# Patient Record
Sex: MALE | Race: OTHER | HISPANIC OR LATINO
[De-identification: names, ages, dates, MRNs, and addresses within clinical notes are randomized per-mention and may not be internally consistent; named-entity substitution may affect disease eponyms.]

---

## 2017-02-10 ENCOUNTER — APPOINTMENT (OUTPATIENT)
Dept: INTERNAL MEDICINE | Facility: CLINIC | Age: 47
End: 2017-02-10

## 2017-02-10 VITALS
DIASTOLIC BLOOD PRESSURE: 70 MMHG | WEIGHT: 180 LBS | SYSTOLIC BLOOD PRESSURE: 110 MMHG | BODY MASS INDEX: 28.25 KG/M2 | HEART RATE: 76 BPM | HEIGHT: 67 IN

## 2017-02-10 DIAGNOSIS — G50.0 TRIGEMINAL NEURALGIA: ICD-10-CM

## 2017-02-10 DIAGNOSIS — Z78.9 OTHER SPECIFIED HEALTH STATUS: ICD-10-CM

## 2017-03-06 LAB
ANION GAP SERPL CALC-SCNC: 7 MEQ/L
BUN SERPL-MCNC: 16 MG/DL
BUN/CREAT SERPL: 19 %
CALCIUM SERPL-MCNC: 9.1 MG/DL
CHLORIDE SERPL-SCNC: 106 MEQ/L
CHOLEST SERPL-MCNC: 217 MG/DL
CO2 SERPL-SCNC: 27 MEQ/L
CREAT SERPL-MCNC: 0.84 MG/DL
ESTIMATED AVERGAGE GLUCOSE (NORTH): 108 MG/DL
GFR SERPL CREATININE-BSD FRML MDRD: 98
GLUCOSE SERPL-MCNC: 87 MG/DL
HBA1C MFR BLD: 5.4 %
HDLC SERPL-MCNC: 47 MG/DL
HDLC SERPL: 4.62
LDLC SERPL DIRECT ASSAY-MCNC: 145 MG/DL
POTASSIUM SERPL-SCNC: 4.7 MMOL/L
SODIUM SERPL-SCNC: 140 MEQ/L
TRIGL SERPL-MCNC: 125 MG/DL
VLDLC SERPL-MCNC: 25 MG/DL

## 2017-03-17 LAB
BASOPHILS # BLD: 0.03 TH/MM3
BASOPHILS NFR BLD: 0.7 %
EOSINOPHIL # BLD: 0.08 TH/MM3
EOSINOPHIL NFR BLD: 1.9 %
ERYTHROCYTE [DISTWIDTH] IN BLOOD BY AUTOMATED COUNT: 13.6 %
GRANULOCYTES # BLD: 2.4 TH/MM3
GRANULOCYTES NFR BLD: 56.8 %
HCT VFR BLD AUTO: 43 %
HGB BLD-MCNC: 14.2 G/DL
IMM GRANULOCYTES # BLD: 0 TH/MM3
IMM GRANULOCYTES NFR BLD: 0 %
LYMPHOCYTES # BLD: 1.38 TH/MM3
LYMPHOCYTES NFR BLD: 32.6 %
MCH RBC QN AUTO: 29 PG
MCHC RBC AUTO-ENTMCNC: 33 G/DL
MCV RBC AUTO: 87.8 FL
MONOCYTES # BLD: 0.34 TH/MM3
MONOCYTES NFR BLD: 8 %
PLATELET # BLD: 221 TH/MM3
PMV BLD AUTO: 10.6 FL
RBC # BLD AUTO: 4.9 MIL/MM3
WBC # BLD: 4.23 TH/MM3

## 2017-03-29 ENCOUNTER — OUTPATIENT (OUTPATIENT)
Dept: OUTPATIENT SERVICES | Facility: HOSPITAL | Age: 47
LOS: 1 days | Discharge: HOME | End: 2017-03-29

## 2017-03-29 ENCOUNTER — APPOINTMENT (OUTPATIENT)
Dept: INTERNAL MEDICINE | Facility: CLINIC | Age: 47
End: 2017-03-29

## 2017-03-29 VITALS
HEIGHT: 67 IN | WEIGHT: 182 LBS | HEART RATE: 61 BPM | DIASTOLIC BLOOD PRESSURE: 72 MMHG | SYSTOLIC BLOOD PRESSURE: 135 MMHG | BODY MASS INDEX: 28.56 KG/M2

## 2017-06-27 DIAGNOSIS — F41.9 ANXIETY DISORDER, UNSPECIFIED: ICD-10-CM

## 2018-06-29 ENCOUNTER — APPOINTMENT (OUTPATIENT)
Dept: INTERNAL MEDICINE | Facility: CLINIC | Age: 48
End: 2018-06-29

## 2018-06-29 ENCOUNTER — OUTPATIENT (OUTPATIENT)
Dept: OUTPATIENT SERVICES | Facility: HOSPITAL | Age: 48
LOS: 1 days | Discharge: HOME | End: 2018-06-29

## 2018-06-29 VITALS
BODY MASS INDEX: 28.56 KG/M2 | TEMPERATURE: 97 F | SYSTOLIC BLOOD PRESSURE: 144 MMHG | HEIGHT: 67 IN | HEART RATE: 77 BPM | DIASTOLIC BLOOD PRESSURE: 75 MMHG | WEIGHT: 182 LBS

## 2018-06-29 DIAGNOSIS — F41.9 ANXIETY DISORDER, UNSPECIFIED: ICD-10-CM

## 2018-06-29 DIAGNOSIS — Z87.19 PERSONAL HISTORY OF OTHER DISEASES OF THE DIGESTIVE SYSTEM: ICD-10-CM

## 2018-06-29 NOTE — HISTORY OF PRESENT ILLNESS
[de-identified] : 46 year old male with history of rectal bleeding secondary to hemorrhoids and also on zoloft for anxiety here for follow up examination. Last seen on 03/29/2017. Patient currently denies any complaints.\par \par Patient denies any fevers, chills, and night sweats. Patient denies any chest pain, shortness of breath, and palpitations. Patient denies any nausea, vomiting, and diarrhea.\par

## 2018-06-29 NOTE — ASSESSMENT
[FreeTextEntry1] : 47 y.o.m presents for f/u. Patient currently denies any complaints.\par \par #) Anxiety\par -well controlled on Zoloft 75mg. \par -follows an outside psychiatrist (psychiatrist at [facility] Dr. Lalit Interiano)\par \par #) HCM\par -flu shot not currently indicated, will address at next visit\par -Td given\par -BMP/CBC (02/27/2017)- wnl\par -A1c (02/16/2017)- 5.4\par -Tot. Chol.- 217, TG- 125, LDL- 145, HDL- 47\par -ASCVD score- 4.1%, low risk\par -colonoscopy done 08/17/2016, next one recommended in 2019\par -RTC in 6-8 weeks repeat b/w ordered

## 2018-08-03 ENCOUNTER — LABORATORY RESULT (OUTPATIENT)
Age: 48
End: 2018-08-03

## 2018-08-11 LAB
ALBUMIN SERPL ELPH-MCNC: 4.4 G/DL
ALP BLD-CCNC: 98 U/L
ALT SERPL-CCNC: 26 U/L
ANION GAP SERPL CALC-SCNC: 15 MMOL/L
AST SERPL-CCNC: 23 U/L
BASOPHILS # BLD AUTO: 0.04 K/UL
BASOPHILS NFR BLD AUTO: 1 %
BILIRUB SERPL-MCNC: 0.6 MG/DL
BUN SERPL-MCNC: 19 MG/DL
CALCIUM SERPL-MCNC: 8.7 MG/DL
CHLORIDE SERPL-SCNC: 100 MMOL/L
CHOLEST SERPL-MCNC: 209 MG/DL
CHOLEST/HDLC SERPL: 4.1 RATIO
CO2 SERPL-SCNC: 22 MMOL/L
CREAT SERPL-MCNC: 0.8 MG/DL
EOSINOPHIL # BLD AUTO: 0.19 K/UL
EOSINOPHIL NFR BLD AUTO: 4.6 %
GLUCOSE SERPL-MCNC: 103 MG/DL
HCT VFR BLD CALC: 39.9 %
HDLC SERPL-MCNC: 51 MG/DL
HGB BLD-MCNC: 12.4 G/DL
IMM GRANULOCYTES NFR BLD AUTO: 0.2 %
LDLC SERPL CALC-MCNC: 155 MG/DL
LYMPHOCYTES # BLD AUTO: 1.29 K/UL
LYMPHOCYTES NFR BLD AUTO: 31.2 %
MAN DIFF?: NORMAL
MCHC RBC-ENTMCNC: 25 PG
MCHC RBC-ENTMCNC: 31.1 G/DL
MCV RBC AUTO: 80.4 FL
MONOCYTES # BLD AUTO: 0.3 K/UL
MONOCYTES NFR BLD AUTO: 7.3 %
NEUTROPHILS # BLD AUTO: 2.3 K/UL
NEUTROPHILS NFR BLD AUTO: 55.7 %
PLATELET # BLD AUTO: 224 K/UL
POTASSIUM SERPL-SCNC: 3.8 MMOL/L
PROT SERPL-MCNC: 7.2 G/DL
RBC # BLD: 4.96 M/UL
RBC # FLD: 16.3 %
SODIUM SERPL-SCNC: 137 MMOL/L
TRIGL SERPL-MCNC: 61 MG/DL
TSH SERPL-ACNC: 3.01 UIU/ML
WBC # FLD AUTO: 4.13 K/UL

## 2018-11-02 ENCOUNTER — APPOINTMENT (OUTPATIENT)
Dept: INTERNAL MEDICINE | Facility: CLINIC | Age: 48
End: 2018-11-02

## 2018-11-02 ENCOUNTER — OUTPATIENT (OUTPATIENT)
Dept: OUTPATIENT SERVICES | Facility: HOSPITAL | Age: 48
LOS: 1 days | Discharge: HOME | End: 2018-11-02

## 2018-11-02 VITALS
DIASTOLIC BLOOD PRESSURE: 73 MMHG | HEART RATE: 62 BPM | BODY MASS INDEX: 28.72 KG/M2 | WEIGHT: 183 LBS | TEMPERATURE: 97.9 F | HEIGHT: 67 IN | SYSTOLIC BLOOD PRESSURE: 113 MMHG

## 2018-11-02 NOTE — COUNSELING
[Weight management counseling provided] : Weight management [Healthy eating counseling provided] : healthy eating [Low Fat Diet] : Low fat diet [Behavioral health counseling provided] : behavioral health  [Engage in a relaxing activity] : Engage in a relaxing activity [None] : None

## 2018-11-02 NOTE — HISTORY OF PRESENT ILLNESS
[FreeTextEntry1] : follow up [de-identified] : 46 year old male with history of rectal bleeding secondary to hemorrhoids and anxiety here for follow up examination. Patient currently denies any complaints. Patient denies any fevers, chills, and night sweats. Patient denies any chest pain, shortness of breath, and palpitations. Patient denies any nausea, vomiting, and diarrhea.

## 2018-11-02 NOTE — ASSESSMENT
[FreeTextEntry1] : 48 year old man presents for f/u no medical problems, does follow up for Mental health\par \par Anxiety\par -well controlled on Zoloft 75mg. \par -follows an outside psychiatrist (psychiatrist at [facility] Dr. Lalit Interiano)\par \par HCM\par -Td given last visit\par -BMP/CBC (02/27/2017)- wnl\par -A1c (08/3/2018)- 5.9. Advised carbohydrate consistent diet and exercise. Will repeat in 6 months\par -Tot. Chol. 209, TG 61, , HDL 51. Advised low fat, cholesterol diet and exercise.\par -colonoscopy done 08/17/2016, next one recommended in 2019\par \par

## 2019-06-14 ENCOUNTER — OUTPATIENT (OUTPATIENT)
Dept: OUTPATIENT SERVICES | Facility: HOSPITAL | Age: 49
LOS: 1 days | Discharge: HOME | End: 2019-06-14

## 2019-06-14 ENCOUNTER — APPOINTMENT (OUTPATIENT)
Dept: INTERNAL MEDICINE | Facility: CLINIC | Age: 49
End: 2019-06-14

## 2019-06-14 VITALS
HEIGHT: 67 IN | SYSTOLIC BLOOD PRESSURE: 119 MMHG | TEMPERATURE: 97.9 F | HEART RATE: 59 BPM | BODY MASS INDEX: 27.47 KG/M2 | DIASTOLIC BLOOD PRESSURE: 74 MMHG | WEIGHT: 175 LBS

## 2019-06-14 DIAGNOSIS — F41.9 ANXIETY DISORDER, UNSPECIFIED: ICD-10-CM

## 2019-06-14 DIAGNOSIS — Z00.00 ENCOUNTER FOR GENERAL ADULT MEDICAL EXAMINATION WITHOUT ABNORMAL FINDINGS: ICD-10-CM

## 2019-06-14 NOTE — PHYSICAL EXAM
[No Acute Distress] : no acute distress [Normal Sclera/Conjunctiva] : normal sclera/conjunctiva [Normal Outer Ear/Nose] : the outer ears and nose were normal in appearance [No Respiratory Distress] : no respiratory distress  [No JVD] : no jugular venous distention [Normal Rate] : normal rate

## 2019-06-14 NOTE — ASSESSMENT
[FreeTextEntry1] : 1 year old man presents for f/u no medical problems, does follow up for Mental health\par \par Anxiety\par -well controlled on Zoloft 75mg. \par -follows an outside psychiatrist (psychiatrist at [facility] Dr. Lalit Interiano)\par \par HCM\par -Td given last visit\par -BMP/CBC 2018 - wnl\par -A1c (08/3/2018)- 5.9. Advised carbohydrate consistent diet and exercise. Will repeat in 6 months\par -Tot. Chol. 209, TG 61, , HDL 51. Advised low fat, cholesterol diet and exercise.\par -colonoscopy done 08/17/2016,  \par \par

## 2019-06-14 NOTE — HISTORY OF PRESENT ILLNESS
[FreeTextEntry1] : 46 year old male with history of rectal bleeding secondary to hemorrhoids and anxiety here for follow up examination. Patient currently denies any complaints. Patient denies any fevers, chills, and night sweats. Patient denies any chest pain, shortness of breath, and palpitations. Patient denies any nausea, vomiting, and diarrhea. \par  [de-identified] : 46 year old male with history of rectal bleeding secondary to hemorrhoids and anxiety here for follow up examination. Patient currently denies any complaints. Patient denies any fevers, chills, and night sweats. Patient denies any chest pain, shortness of breath, and palpitations. Patient denies any nausea, vomiting, and diarrhea. \par

## 2019-10-04 LAB
ALBUMIN SERPL ELPH-MCNC: 4.3 G/DL
ALP BLD-CCNC: 107 U/L
ALT SERPL-CCNC: 20 U/L
ANION GAP SERPL CALC-SCNC: 10 MMOL/L
AST SERPL-CCNC: 22 U/L
BASOPHILS # BLD AUTO: 0.04 K/UL
BASOPHILS NFR BLD AUTO: 0.9 %
BILIRUB SERPL-MCNC: 0.4 MG/DL
BUN SERPL-MCNC: 17 MG/DL
CALCIUM SERPL-MCNC: 8.9 MG/DL
CHLORIDE SERPL-SCNC: 104 MMOL/L
CHOLEST SERPL-MCNC: 222 MG/DL
CHOLEST/HDLC SERPL: 4 RATIO
CO2 SERPL-SCNC: 26 MMOL/L
CREAT SERPL-MCNC: 0.7 MG/DL
EOSINOPHIL # BLD AUTO: 0.13 K/UL
EOSINOPHIL NFR BLD AUTO: 3 %
ESTIMATED AVERAGE GLUCOSE: 108 MG/DL
GLUCOSE SERPL-MCNC: 97 MG/DL
HBA1C MFR BLD HPLC: 5.4 %
HCT VFR BLD CALC: 36.6 %
HDLC SERPL-MCNC: 55 MG/DL
HGB BLD-MCNC: 11 G/DL
IMM GRANULOCYTES NFR BLD AUTO: 0.2 %
LDLC SERPL CALC-MCNC: 159 MG/DL
LYMPHOCYTES # BLD AUTO: 1.53 K/UL
LYMPHOCYTES NFR BLD AUTO: 35.3 %
MAN DIFF?: NORMAL
MCHC RBC-ENTMCNC: 22.2 PG
MCHC RBC-ENTMCNC: 30.1 G/DL
MCV RBC AUTO: 73.9 FL
MONOCYTES # BLD AUTO: 0.28 K/UL
MONOCYTES NFR BLD AUTO: 6.5 %
NEUTROPHILS # BLD AUTO: 2.34 K/UL
NEUTROPHILS NFR BLD AUTO: 54.1 %
PLATELET # BLD AUTO: 248 K/UL
POTASSIUM SERPL-SCNC: 4.8 MMOL/L
PROT SERPL-MCNC: 7 G/DL
RBC # BLD: 4.95 M/UL
RBC # FLD: 17.9 %
SODIUM SERPL-SCNC: 140 MMOL/L
TRIGL SERPL-MCNC: 87 MG/DL
WBC # FLD AUTO: 4.33 K/UL

## 2019-10-11 ENCOUNTER — OUTPATIENT (OUTPATIENT)
Dept: OUTPATIENT SERVICES | Facility: HOSPITAL | Age: 49
LOS: 1 days | Discharge: HOME | End: 2019-10-11

## 2019-10-11 ENCOUNTER — APPOINTMENT (OUTPATIENT)
Dept: INTERNAL MEDICINE | Facility: CLINIC | Age: 49
End: 2019-10-11
Payer: COMMERCIAL

## 2019-10-11 VITALS
DIASTOLIC BLOOD PRESSURE: 81 MMHG | TEMPERATURE: 97.3 F | SYSTOLIC BLOOD PRESSURE: 133 MMHG | HEIGHT: 67 IN | HEART RATE: 61 BPM | WEIGHT: 183 LBS | BODY MASS INDEX: 28.72 KG/M2

## 2019-10-11 PROCEDURE — 99212 OFFICE O/P EST SF 10 MIN: CPT

## 2019-10-11 NOTE — ASSESSMENT
[FreeTextEntry1] : 49 year old man presents for routine follow up.\par \par Microcytic anemia\par - Hgb 11 [from 14 in 2017] ; MCV 73.9\par - will send for iron studies\par - GI referral \par - FU 8-10 weeks \par \par Anxiety\par -will increase zoloft to 75 and d/c clonazepam. \par -states outside psychiatrist moved to [place]; in the process of finding another Lebanese speaking psychiatrist, but finds his symptoms well controlled \par - offered pt combination therapy with trileptal however, pt denied \par - will see pt in 8-10 weeks to FU\par \par HCM\par -Flu vaccine today 10/11/2019\par -BMP 9/2019 - wnl\par -A1c (09/2019)- 5.4. Advised carbohydrate consistent diet and exercise. Will repeat in 6 months\par -Tot. Chol. 222, TG 87, , HDL 55. Advised low fat, cholesterol diet and exercise.\par -colonoscopy done 08/17/2016; resend to GI clinic in light of hx and new anemia \par

## 2019-10-11 NOTE — HISTORY OF PRESENT ILLNESS
[FreeTextEntry1] : follow up  [de-identified] : 46 year old male with history of rectal bleeding secondary to hemorrhoids and anxiety here for follow up examination; He was last seen in June 2019.\par \par Patient currently denies any complaints. Patient denies any fevers, chills, and night sweats. Patient denies any chest pain, shortness of breath, and palpitations. Patient denies any nausea, vomiting, and diarrhea. He has had no bleeding events since his last visit. \par  \par

## 2019-10-21 DIAGNOSIS — Z23 ENCOUNTER FOR IMMUNIZATION: ICD-10-CM

## 2019-10-21 DIAGNOSIS — Z00.00 ENCOUNTER FOR GENERAL ADULT MEDICAL EXAMINATION WITHOUT ABNORMAL FINDINGS: ICD-10-CM

## 2019-10-21 DIAGNOSIS — D64.9 ANEMIA, UNSPECIFIED: ICD-10-CM

## 2020-01-01 LAB
RBC # BLD: 4.91 M/UL
RETICS # AUTO: 1.3 %
RETICS AGGREG/RBC NFR: 64.3 K/UL

## 2020-01-03 ENCOUNTER — APPOINTMENT (OUTPATIENT)
Dept: GASTROENTEROLOGY | Facility: CLINIC | Age: 50
End: 2020-01-03

## 2020-02-28 ENCOUNTER — APPOINTMENT (OUTPATIENT)
Dept: INTERNAL MEDICINE | Facility: CLINIC | Age: 50
End: 2020-02-28
Payer: COMMERCIAL

## 2020-02-28 ENCOUNTER — OUTPATIENT (OUTPATIENT)
Dept: OUTPATIENT SERVICES | Facility: HOSPITAL | Age: 50
LOS: 1 days | Discharge: HOME | End: 2020-02-28

## 2020-02-28 VITALS
WEIGHT: 192 LBS | HEART RATE: 62 BPM | SYSTOLIC BLOOD PRESSURE: 152 MMHG | HEIGHT: 67 IN | BODY MASS INDEX: 30.13 KG/M2 | DIASTOLIC BLOOD PRESSURE: 76 MMHG

## 2020-02-28 DIAGNOSIS — R63.5 ABNORMAL WEIGHT GAIN: ICD-10-CM

## 2020-02-28 PROCEDURE — 99213 OFFICE O/P EST LOW 20 MIN: CPT | Mod: GC

## 2020-02-28 NOTE — HISTORY OF PRESENT ILLNESS
[FreeTextEntry1] : follow up after 10/19 [de-identified] : 49 year old male with history of rectal bleeding secondary to hemorrhoids and anxiety here for follow up examination; He was last seen in oct,19 \par Patient currently denies any complaints. Patient denies any fevers, chills, and night sweats. Patient denies any chest pain, shortness of breath, and palpitations. Patient denies any nausea, vomiting, and diarrhea. He has had no bleeding events since his last visit. \par colon ; 2016 showed internal and external hemorrhoids ,fair prep and was asked to repeat colonoscopy  in 3 yrs .\par Last Visit he was noticed to be microcytic anemia,was asked to see GI and repeat iron studies which were not done . \par  \par

## 2020-02-28 NOTE — ASSESSMENT
[FreeTextEntry1] : 49 year old male with history of rectal bleeding secondary to hemorrhoids,new microcytic anemia  and anxiety here for follow up after 10/19\par \par #Microcytic anemia\par - Hgb 11 [from 14 in 2017] ; MCV 73.9\par - will send for iron studies \par \par #Anxiety\par -will increase zoloft to 75 and d/c clonazepam. \par -states outside psychiatrist moved to [place]; in the process of finding another Jordanian speaking psychiatrist, but finds his symptoms well controlled \par - offered pt combination therapy with trileptal however, pt denied \par \par # elevated bp ;\par repeat 140/100\par will monitor and  re eval pt in 2 mo\par \par # weight gain ;\par advised wt loss and dietary modification\par \par \par HCM\par -Flu vaccine today 10/11/2019\par -BMP 9/2019 - wnl\par -A1c (09/2019)- 5.4. Advised carbohydrate consistent diet and exercise. Will repeat in 6 months\par -Tot. Chol. 222, TG 87, , HDL 55. Advised low fat, cholesterol diet and exercise.\par -colonoscopy done 08/17/2016; resend to GI clinic in light of hx and new anemia \par also last colon prep was fair and was advised to have repeat colonoscopy in 3 yrs \par follow up in 2  mo\par

## 2020-02-28 NOTE — PHYSICAL EXAM
[No Acute Distress] : no acute distress [Well Nourished] : well nourished [Normal Sclera/Conjunctiva] : normal sclera/conjunctiva [Normal Outer Ear/Nose] : the outer ears and nose were normal in appearance [No JVD] : no jugular venous distention [No Respiratory Distress] : no respiratory distress  [No Accessory Muscle Use] : no accessory muscle use [Normal S1, S2] : normal S1 and S2 [No Edema] : there was no peripheral edema [Soft] : abdomen soft [Non Tender] : non-tender [Non-distended] : non-distended [Normal Bowel Sounds] : normal bowel sounds [No CVA Tenderness] : no CVA  tenderness [No Focal Deficits] : no focal deficits [Alert and Oriented x3] : oriented to person, place, and time

## 2020-03-04 DIAGNOSIS — F41.9 ANXIETY DISORDER, UNSPECIFIED: ICD-10-CM

## 2020-03-04 DIAGNOSIS — D64.9 ANEMIA, UNSPECIFIED: ICD-10-CM

## 2020-03-04 DIAGNOSIS — R03.0 ELEVATED BLOOD-PRESSURE READING, WITHOUT DIAGNOSIS OF HYPERTENSION: ICD-10-CM

## 2020-03-04 DIAGNOSIS — R63.5 ABNORMAL WEIGHT GAIN: ICD-10-CM

## 2020-04-24 ENCOUNTER — APPOINTMENT (OUTPATIENT)
Dept: INTERNAL MEDICINE | Facility: CLINIC | Age: 50
End: 2020-04-24

## 2020-06-12 LAB
BASOPHILS # BLD AUTO: 0.03 K/UL
BASOPHILS NFR BLD AUTO: 0.6 %
EOSINOPHIL # BLD AUTO: 0.12 K/UL
EOSINOPHIL NFR BLD AUTO: 2.4 %
FERRITIN SERPL-MCNC: 9 NG/ML
FOLATE RBC-MCNC: 1180 NG/ML
HCT VFR BLD CALC: 40.2 %
HCT VFR BLD CALC: 40.5 %
HGB BLD-MCNC: 11.8 G/DL
IMM GRANULOCYTES NFR BLD AUTO: 0.2 %
IRON SATN MFR SERPL: 7 %
IRON SERPL-MCNC: 36 UG/DL
LYMPHOCYTES # BLD AUTO: 1.71 K/UL
LYMPHOCYTES NFR BLD AUTO: 34.5 %
MAN DIFF?: NORMAL
MCHC RBC-ENTMCNC: 22.9 PG
MCHC RBC-ENTMCNC: 29.4 G/DL
MCV RBC AUTO: 77.9 FL
MONOCYTES # BLD AUTO: 0.37 K/UL
MONOCYTES NFR BLD AUTO: 7.5 %
NEUTROPHILS # BLD AUTO: 2.72 K/UL
NEUTROPHILS NFR BLD AUTO: 54.8 %
PLATELET # BLD AUTO: 259 K/UL
RBC # BLD: 5.16 M/UL
RBC # FLD: 15.8 %
TIBC SERPL-MCNC: 493 UG/DL
TRANSFERRIN SERPL-MCNC: 417 MG/DL
UIBC SERPL-MCNC: 457 UG/DL
VIT B12 SERPL-MCNC: 446 PG/ML
WBC # FLD AUTO: 4.96 K/UL

## 2020-07-01 ENCOUNTER — OUTPATIENT (OUTPATIENT)
Dept: OUTPATIENT SERVICES | Facility: HOSPITAL | Age: 50
LOS: 1 days | Discharge: HOME | End: 2020-07-01

## 2020-07-01 ENCOUNTER — APPOINTMENT (OUTPATIENT)
Dept: INTERNAL MEDICINE | Facility: CLINIC | Age: 50
End: 2020-07-01
Payer: COMMERCIAL

## 2020-07-01 VITALS
BODY MASS INDEX: 28.72 KG/M2 | DIASTOLIC BLOOD PRESSURE: 74 MMHG | WEIGHT: 183 LBS | HEART RATE: 65 BPM | HEIGHT: 67 IN | TEMPERATURE: 97.9 F | SYSTOLIC BLOOD PRESSURE: 116 MMHG

## 2020-07-01 DIAGNOSIS — F41.9 ANXIETY DISORDER, UNSPECIFIED: ICD-10-CM

## 2020-07-01 DIAGNOSIS — D64.9 ANEMIA, UNSPECIFIED: ICD-10-CM

## 2020-07-01 PROCEDURE — 99212 OFFICE O/P EST SF 10 MIN: CPT | Mod: GC

## 2020-07-01 NOTE — PLAN
[FreeTextEntry1] : 49 year old male with history of rectal bleeding secondary to hemorrhoids and anxiety here for follow up examination; He was last seen in oct,19 \par Patient currently denies any complaints. Patient denies any fevers, chills, and night sweats. Patient denies any chest pain, shortness of breath, and palpitations. Patient denies any nausea, vomiting, and diarrhea. He has had no bleeding events since his last visit. \par colon ; 2016 showed internal and external hemorrhoids ,fair prep and was asked to repeat colonoscopy in 3 yrs.\par Pt underwent Colono at  [facility] , will follow up for resutls but was generally told every thing is ok.\par \par \par 49 year old male with history of rectal bleeding secondary to hemorrhoids,new microcytic anemia and anxiety here for follow up after 10/19\par \par #Microcytic anemia\par - Hgb 11 [from 14 in 2017] ; MCV 73.9\par - stable\par \par #Anxiety\par -  zoloft to 75 and d/c clonazepam. \par -states outside psychiatrist moved to [place]; in the process of finding another Gabonese speaking psychiatrist, but finds his symptoms well controlled \par - offered pt combination therapy with trileptal however, pt denied \par \par # elevated bp ;\par Improved , pt lost weight\par will monitor and re eval pt in 2 mo\par \par # weight gain ;\par advised wt loss and dietary modification\par \par \par HCM\par -Flu vaccine today 10/11/2019\par -BMP 9/2019 - wnl\par -A1c (09/2019)- 5.4. Advised carbohydrate consistent diet and exercise. Will repeat in 6 months\par -Tot. Chol. 222, TG 87, , HDL 55. Advised low fat, cholesterol diet and exercise.\par -will have to f/u with [facility] for resutls

## 2020-07-01 NOTE — HISTORY OF PRESENT ILLNESS
[FreeTextEntry1] : Follow Up [de-identified] : 49 year old male with history of rectal bleeding secondary to hemorrhoids and anxiety here for follow up examination; He was last seen in oct,19 \par Patient currently denies any complaints. Patient denies any fevers, chills, and night sweats. Patient denies any chest pain, shortness of breath, and palpitations. Patient denies any nausea, vomiting, and diarrhea. He has had no bleeding events since his last visit. \par colon ; 2016 showed internal and external hemorrhoids ,fair prep and was asked to repeat colonoscopy in 3 yrs.\par Pt underwent Colono at  [facility] , will follow up for resutls but was generally told every thing is ok.

## 2020-11-27 LAB
BASOPHILS # BLD AUTO: 0.03 K/UL
BASOPHILS NFR BLD AUTO: 0.8 %
EOSINOPHIL # BLD AUTO: 0.14 K/UL
EOSINOPHIL NFR BLD AUTO: 3.5 %
HCT VFR BLD CALC: 43.5 %
HGB BLD-MCNC: 14 G/DL
IMM GRANULOCYTES NFR BLD AUTO: 0.3 %
LYMPHOCYTES # BLD AUTO: 1.43 K/UL
LYMPHOCYTES NFR BLD AUTO: 35.9 %
MAN DIFF?: NORMAL
MCHC RBC-ENTMCNC: 26.1 PG
MCHC RBC-ENTMCNC: 32.2 G/DL
MCV RBC AUTO: 81.2 FL
MONOCYTES # BLD AUTO: 0.32 K/UL
MONOCYTES NFR BLD AUTO: 8 %
NEUTROPHILS # BLD AUTO: 2.05 K/UL
NEUTROPHILS NFR BLD AUTO: 51.5 %
PLATELET # BLD AUTO: 219 K/UL
RBC # BLD: 5.36 M/UL
RBC # FLD: 18.1 %
WBC # FLD AUTO: 3.98 K/UL

## 2021-01-20 ENCOUNTER — OUTPATIENT (OUTPATIENT)
Dept: OUTPATIENT SERVICES | Facility: HOSPITAL | Age: 51
LOS: 1 days | Discharge: HOME | End: 2021-01-20

## 2021-01-20 ENCOUNTER — APPOINTMENT (OUTPATIENT)
Dept: INTERNAL MEDICINE | Facility: CLINIC | Age: 51
End: 2021-01-20
Payer: COMMERCIAL

## 2021-01-20 VITALS
HEIGHT: 67 IN | TEMPERATURE: 98.8 F | HEART RATE: 62 BPM | BODY MASS INDEX: 29.03 KG/M2 | SYSTOLIC BLOOD PRESSURE: 122 MMHG | DIASTOLIC BLOOD PRESSURE: 67 MMHG | WEIGHT: 185 LBS | OXYGEN SATURATION: 98 %

## 2021-01-20 PROCEDURE — 99212 OFFICE O/P EST SF 10 MIN: CPT | Mod: GC

## 2021-01-20 RX ORDER — CHLORHEXIDINE GLUCONATE 4 %
325 (65 FE) LIQUID (ML) TOPICAL
Qty: 90 | Refills: 6 | Status: DISCONTINUED | COMMUNITY
Start: 2020-07-01 | End: 2021-01-20

## 2021-01-20 RX ORDER — FERROUS SULFATE 325(65) MG
325 (65 FE) TABLET ORAL 3 TIMES DAILY
Qty: 30 | Refills: 2 | Status: DISCONTINUED | COMMUNITY
Start: 2020-07-01 | End: 2021-01-20

## 2021-01-28 DIAGNOSIS — Z00.00 ENCOUNTER FOR GENERAL ADULT MEDICAL EXAMINATION WITHOUT ABNORMAL FINDINGS: ICD-10-CM

## 2021-02-17 NOTE — PLAN
[FreeTextEntry1] : Patient is a 50 year old male with history of rectal bleeding secondary to hemorrhoids and anxiety here for follow up visit. Anemia resolved. Colonoscopy normal.\par \par #Microcytic anemia likely 2/2 hemorrhoids\par - Hgb 14 from 11/2020 (previously 11.5g/dL)\par - Can stop iron supplements\par - Will repeat CBC in 6 weeks\par -Colonoscopy showed benign polyps, hemorrhoids\par -Other cell lines and creatinine normal\par \par #Anxiety\par - Continue zoloft 75mg. Denies active anxiety, depression symptoms\par \par # weight gain ;\par advised wt loss and dietary modification\par \par HCM\par -Colonoscopy in 2/2020. Repeat in 9yrs\par -Flu vaccine today 1/20/2021\par Labs before next visit\par

## 2021-02-17 NOTE — HISTORY OF PRESENT ILLNESS
[FreeTextEntry1] : Follow up [de-identified] : Patient is a 50 year old male with history of rectal bleeding secondary to hemorrhoids and anxiety here for follow up visit. \par Patient currently denies any active complaints. Patient denies fevers, chills, cough, shortness of breath, chest pain, palpitations, nausea, vomiting, and diarrhea. He had blood work for iron deficiency anemia. He takes iron supplements twice daily. Hb is 14.0 from 11/2020. He still reports seeing slight blood in stool when he is constipated and strains to go to the bathroom. He had colonoscopy done in 2/2020 from [facility]. Patient has reports in hard copy. Colonoscopy showed hyperplastic polyps, hemorrhoids. Biopsy was benign. EGD showed H.pylori and he was treated with triple therapy.\par

## 2021-03-23 LAB
25(OH)D3 SERPL-MCNC: 20.4 NG/ML
ALBUMIN SERPL ELPH-MCNC: 4.4 G/DL
ALP BLD-CCNC: 124 U/L
ALT SERPL-CCNC: 20 U/L
ANION GAP SERPL CALC-SCNC: 9 MMOL/L
APPEARANCE: CLEAR
AST SERPL-CCNC: 22 U/L
BASOPHILS # BLD AUTO: 0.05 K/UL
BASOPHILS NFR BLD AUTO: 1 %
BILIRUB SERPL-MCNC: 0.4 MG/DL
BILIRUBIN URINE: NEGATIVE
BLOOD URINE: NEGATIVE
BUN SERPL-MCNC: 18 MG/DL
CALCIUM SERPL-MCNC: 9.2 MG/DL
CHLORIDE SERPL-SCNC: 104 MMOL/L
CHOLEST SERPL-MCNC: 215 MG/DL
CO2 SERPL-SCNC: 28 MMOL/L
COLOR: YELLOW
CREAT SERPL-MCNC: 0.91 MG/DL
CREAT SPEC-SCNC: 228 MG/DL
EOSINOPHIL # BLD AUTO: 0.16 K/UL
EOSINOPHIL NFR BLD AUTO: 3.1 %
GLUCOSE QUALITATIVE U: NEGATIVE
GLUCOSE SERPL-MCNC: 99 MG/DL
HCT VFR BLD CALC: 46.7 %
HDLC SERPL-MCNC: 40 MG/DL
HGB BLD-MCNC: 15 G/DL
IMM GRANULOCYTES NFR BLD AUTO: 0.2 %
KETONES URINE: NEGATIVE
LDLC SERPL CALC-MCNC: 137 MG/DL
LEUKOCYTE ESTERASE URINE: NEGATIVE
LYMPHOCYTES # BLD AUTO: 1.57 K/UL
LYMPHOCYTES NFR BLD AUTO: 30.9 %
MAN DIFF?: NORMAL
MCHC RBC-ENTMCNC: 28.8 PG
MCHC RBC-ENTMCNC: 32.1 GM/DL
MCV RBC AUTO: 89.6 FL
MICROALBUMIN 24H UR DL<=1MG/L-MCNC: <1.2 MG/DL
MICROALBUMIN/CREAT 24H UR-RTO: NORMAL MG/G
MONOCYTES # BLD AUTO: 0.43 K/UL
MONOCYTES NFR BLD AUTO: 8.5 %
NEUTROPHILS # BLD AUTO: 2.86 K/UL
NEUTROPHILS NFR BLD AUTO: 56.3 %
NITRITE URINE: NEGATIVE
NONHDLC SERPL-MCNC: 175 MG/DL
PH URINE: 6
PLATELET # BLD AUTO: 241 K/UL
POTASSIUM SERPL-SCNC: 4.7 MMOL/L
PROT SERPL-MCNC: 7 G/DL
PROTEIN URINE: NORMAL
RBC # BLD: 5.21 M/UL
RBC # FLD: 13.3 %
SODIUM SERPL-SCNC: 141 MMOL/L
SPECIFIC GRAVITY URINE: 1.03
TRIGL SERPL-MCNC: 188 MG/DL
TSH SERPL-ACNC: 4.02 UIU/ML
UROBILINOGEN URINE: ABNORMAL
WBC # FLD AUTO: 5.08 K/UL

## 2021-03-31 ENCOUNTER — OUTPATIENT (OUTPATIENT)
Dept: OUTPATIENT SERVICES | Facility: HOSPITAL | Age: 51
LOS: 1 days | Discharge: HOME | End: 2021-03-31

## 2021-03-31 ENCOUNTER — APPOINTMENT (OUTPATIENT)
Dept: INTERNAL MEDICINE | Facility: CLINIC | Age: 51
End: 2021-03-31
Payer: COMMERCIAL
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
## 2021-03-31 DIAGNOSIS — E11.9 TYPE 2 DIABETES MELLITUS WITHOUT COMPLICATIONS: ICD-10-CM

## 2021-03-31 PROCEDURE — 99213 OFFICE O/P EST LOW 20 MIN: CPT | Mod: GC

## 2021-04-12 ENCOUNTER — APPOINTMENT (OUTPATIENT)
Dept: SURGERY | Facility: CLINIC | Age: 51
End: 2021-04-12
Payer: SUBSIDIZED

## 2021-04-12 VITALS
DIASTOLIC BLOOD PRESSURE: 78 MMHG | TEMPERATURE: 97.5 F | WEIGHT: 192 LBS | HEIGHT: 67 IN | HEART RATE: 78 BPM | SYSTOLIC BLOOD PRESSURE: 126 MMHG | BODY MASS INDEX: 30.13 KG/M2

## 2021-04-12 PROCEDURE — 46600 DIAGNOSTIC ANOSCOPY SPX: CPT

## 2021-04-12 PROCEDURE — 99072 ADDL SUPL MATRL&STAF TM PHE: CPT

## 2021-04-12 PROCEDURE — 99203 OFFICE O/P NEW LOW 30 MIN: CPT | Mod: 25

## 2021-04-23 NOTE — HISTORY OF PRESENT ILLNESS
[FreeTextEntry1] : Patient is a 50M with  PMH of anemia, anxiety and constipation who presents for evaluation of bleeding hemorrhoids.  He states it has been present for about 1 year and intermittent.  It has increased recently.  He states he has to strain with BM but has BM daily. Patient denies fevers, chills, nausea, vomiting, abdominal pain, constipation, diarrhea, blood in his stool or unexpected weight loss.  Patient denies a family history of colon cancer rectal cancer or inflammatory bowel disease.  His last colonoscopy was in 2020 with Dr. Cook that showed diverticulosis and hemorrhoids

## 2021-04-23 NOTE — CONSULT LETTER
[Dear  ___] : Dear  [unfilled], [Courtesy Letter:] : I had the pleasure of seeing your patient, [unfilled], in my office today. [Please see my note below.] : Please see my note below. [Consult Closing:] : Thank you very much for allowing me to participate in the care of this patient.  If you have any questions, please do not hesitate to contact me. [FreeTextEntry3] : Sincerely,\par \par Maxwell Calix MD, Colon and Rectal Surgery\par \par [employer] [facility]\par [address] [place] [address]\par [facility], 3rd Floor\par [place], [place] [address]\par Tel [phone] ext [phone]\par Fax [phone]\par

## 2021-04-23 NOTE — ASSESSMENT
[FreeTextEntry1] : 50M with grade I hemorrhoids\par \par I discussed the pathology of bleeding grade I hemorrhoids.  I recommended a course of conservative management including a high fiber diet, fiber supplement, increased water intake and laxatives as needed.  He will return in 2-3 months. \par

## 2021-04-23 NOTE — PROCEDURE
[FreeTextEntry1] : RIVERA and anoscopy show normal sphincter tone, grade I internal hemorrhoids and no masses.\par

## 2021-04-23 NOTE — PHYSICAL EXAM
[Abdomen Masses] : No abdominal masses [Abdomen Tenderness] : ~T No ~M abdominal tenderness [No HSM] : no hepatosplenomegaly [Excoriation] : no perianal excoriation [Fistula] : no fistulas [Wart] : no warts [Ulcer ___ cm] : no ulcers [Pilonidal Cyst] : no pilonidal cysts [Nonprolapsing] : a nonprolapsing (grade I) [Tender, Swollen] : nontender, non-swollen [Thrombosed] : that was not thrombosed [Skin Tags] : there were no residual hemorrhoidal skin tags seen [Normal] : was normal [None] : there was no rectal mass  [Respiratory Effort] : normal respiratory effort [Normal Rate and Rhythm] : normal rate and rhythm [de-identified] : external examination shows no significant abnormalities [de-identified] : awake, alert and in no acute distress
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
## 2021-04-28 NOTE — HISTORY OF PRESENT ILLNESS
[FreeTextEntry1] : Follow up [de-identified] : Patient is a 50 year old male with history of rectal bleeding secondary to hemorrhoids and anxiety here for follow up visit. \par Patient reports he still has active rectal bleed 2/2 to internal hemorrhoids in which had colonoscopy done in 2/2020 from [facility] and colonoscopy showed hyperplastic polyps, hemorrhoids. Biopsy was benign. EGD showed H.pylori and he was treated with triple therapy, during that visit he was referred to a proctologist however was lost to follow up. \par \par Patient also reports he had a mechanical fall 2 weeks ago and hit his chest in which he has mild pain now 4/10 resolving with Tylenol. No sob, diaphoresis, bleed, no deformities noted.

## 2021-04-28 NOTE — PLAN
[FreeTextEntry1] : Patient is a 50 year old male with history of rectal bleeding secondary to hemorrhoids and anxiety here for follow up visit due to active hematochezia 2/2 to hemorrhoids.\par \par #Microcytic anemia likely 2/2 internal hemorrhoids\par - Hgb 15 from 3/2021 (previously 11.5g/dL in 2017) (stable)\par - advised patient to stop taking iron supplements\par -Colonoscopy 2020 outpatient in [facility] showed benign polyps, internal hemorrhoids and was referred to a proctologist for removal however was lost to follow up -> will refer to colorectal surgery \par \par #Mechanical fall\par - c/w Tylenol 4/10 which is improving\par - no deformity, sob, pleuritic pain \par \par #Anxiety\par - Continue zoloft 75mg. Denies active anxiety, depression symptoms\par \par #Dyslipidemia\par - advised on diet and lifestyle modification \par \par # weight gain ;\par advised wt loss and dietary modification\par \par #Vit D deficiency\par vit d 20: c/w supplements \par \par HCM\par -Colonoscopy in 2/2020. Repeat in 5 years\par -Flu vaccine today 1/20/2021\par Labs before next visit\par - RTC in 3 months \par

## 2021-06-28 ENCOUNTER — APPOINTMENT (OUTPATIENT)
Dept: SURGERY | Facility: CLINIC | Age: 51
End: 2021-06-28
Payer: SELF-PAY

## 2021-06-28 VITALS
TEMPERATURE: 97.2 F | WEIGHT: 187 LBS | BODY MASS INDEX: 29.35 KG/M2 | HEIGHT: 67 IN | HEART RATE: 74 BPM | DIASTOLIC BLOOD PRESSURE: 76 MMHG | SYSTOLIC BLOOD PRESSURE: 124 MMHG

## 2021-06-28 PROCEDURE — 99213 OFFICE O/P EST LOW 20 MIN: CPT

## 2021-06-29 NOTE — HISTORY OF PRESENT ILLNESS
[FreeTextEntry1] : Patient is a 50M with  PMH of anemia, anxiety and constipation who presents for follow up of bleeding grade I hemorrhoids. He was started on conservative management on his last visit.  He presents now stating his constipation has improved but he continues to have some bleeding at times. Patient denies fevers, chills, nausea, vomiting, abdominal pain, constipation, diarrhea, blood in his stool or unexpected weight loss.  Patient denies a family history of colon cancer rectal cancer or inflammatory bowel disease.  His last colonoscopy was in 2020 with Dr. Cook that showed diverticulosis and hemorrhoids

## 2021-06-29 NOTE — PHYSICAL EXAM
[Abdomen Masses] : No abdominal masses [Abdomen Tenderness] : ~T No ~M abdominal tenderness [No HSM] : no hepatosplenomegaly [Excoriation] : no perianal excoriation [Fistula] : no fistulas [Wart] : no warts [Ulcer ___ cm] : no ulcers [Pilonidal Cyst] : no pilonidal cysts [Nonprolapsing] : a nonprolapsing (grade I) [Tender, Swollen] : nontender, non-swollen [Thrombosed] : that was not thrombosed [Skin Tags] : there were no residual hemorrhoidal skin tags seen [Normal] : was normal [None] : there was no rectal mass  [Respiratory Effort] : normal respiratory effort [Normal Rate and Rhythm] : normal rate and rhythm [de-identified] : external examination shows no significant abnormalities [de-identified] : awake, alert and in no acute distress

## 2021-06-29 NOTE — ASSESSMENT
[FreeTextEntry1] : 50M with bleeding grade I hemorrhoids\par \par I spoke to the patient about continued conservative management vs banding.  He would like to undergo banding.  We will see him back in 2-3 weeks for the procedure.

## 2021-07-19 ENCOUNTER — APPOINTMENT (OUTPATIENT)
Dept: SURGERY | Facility: CLINIC | Age: 51
End: 2021-07-19
Payer: SELF-PAY

## 2021-07-19 VITALS
TEMPERATURE: 97.8 F | HEIGHT: 67 IN | WEIGHT: 190 LBS | SYSTOLIC BLOOD PRESSURE: 126 MMHG | BODY MASS INDEX: 29.82 KG/M2 | HEART RATE: 64 BPM | DIASTOLIC BLOOD PRESSURE: 70 MMHG

## 2021-07-19 PROCEDURE — 46221 LIGATION OF HEMORRHOID(S): CPT

## 2021-07-19 NOTE — ASSESSMENT
[FreeTextEntry1] : 50M with bleeding grade I hemorrhoids s/p banding\par \par I had a long conversation with the patient regarding banding.  We spoke about the risk of bleeding and infection.  We will see him back in 3 weeks.

## 2021-07-19 NOTE — PROCEDURE
[FreeTextEntry1] : RIVERA and anoscopy show normal sphincter tone, large grade I internal hemorrhoids and no masses.\par \par After discussion with the patient about hemorrhoidal banding including all risks benefits and alternatives, we proceeded with banding.  Patient was in agreement with the plan and signed surgical consent.\par \par With the patient in the prone jackknife position, the anoscope was inserted to isolate the right posterolateral and left lateral hemorrhoids.  They appeared large and friable with contact bleeding.  Using the disposable suction hemorrhoidal , one band was placed in each location.  This produced some minor bleeding which was self limited.  The patient tolerated the procedure well without pain or dizziness.\par

## 2021-07-19 NOTE — PHYSICAL EXAM
[Abdomen Masses] : No abdominal masses [Abdomen Tenderness] : ~T No ~M abdominal tenderness [No HSM] : no hepatosplenomegaly [Excoriation] : no perianal excoriation [Fistula] : no fistulas [Wart] : no warts [Ulcer ___ cm] : no ulcers [Pilonidal Cyst] : no pilonidal cysts [Nonprolapsing] : a nonprolapsing (grade I) [Tender, Swollen] : nontender, non-swollen [Thrombosed] : that was not thrombosed [Skin Tags] : there were no residual hemorrhoidal skin tags seen [Normal] : was normal [None] : there was no rectal mass  [Respiratory Effort] : normal respiratory effort [Normal Rate and Rhythm] : normal rate and rhythm [de-identified] : external examination shows no significant abnormalities [de-identified] : awake, alert and in no acute distress

## 2021-07-19 NOTE — HISTORY OF PRESENT ILLNESS
[FreeTextEntry1] : Patient is a 50M with  PMH of anemia, anxiety and constipation who presents for follow up of bleeding grade I hemorrhoids. He was started on conservative management on his last visit.  He states his constipation has improved but he continues to have some bleeding at times.  He presents today for banding. Patient denies fevers, chills, nausea, vomiting, abdominal pain, constipation, diarrhea, blood in his stool or unexpected weight loss.  Patient denies a family history of colon cancer rectal cancer or inflammatory bowel disease.  His last colonoscopy was in 2020 with Dr. Cook that showed diverticulosis and hemorrhoids

## 2021-07-19 NOTE — CONSULT LETTER
[Dear  ___] : Dear  [unfilled], [Courtesy Letter:] : I had the pleasure of seeing your patient, [unfilled], in my office today. [Please see my note below.] : Please see my note below. [Consult Closing:] : Thank you very much for allowing me to participate in the care of this patient.  If you have any questions, please do not hesitate to contact me. [FreeTextEntry3] : Sincerely,\par \par Maxwell Calix MD, Colon and Rectal Surgery\par \par [employer] [facility]\par [address] [place] [address]\par [facility], 3rd Floor\par [place], [place] [address]\par Tel [phone]\par Fax [phone]\par

## 2021-07-30 ENCOUNTER — APPOINTMENT (OUTPATIENT)
Dept: INTERNAL MEDICINE | Facility: CLINIC | Age: 51
End: 2021-07-30
Payer: COMMERCIAL

## 2021-07-30 ENCOUNTER — OUTPATIENT (OUTPATIENT)
Dept: OUTPATIENT SERVICES | Facility: HOSPITAL | Age: 51
LOS: 1 days | Discharge: HOME | End: 2021-07-30

## 2021-07-30 VITALS
SYSTOLIC BLOOD PRESSURE: 143 MMHG | HEIGHT: 78 IN | OXYGEN SATURATION: 99 % | WEIGHT: 186 LBS | TEMPERATURE: 97.3 F | DIASTOLIC BLOOD PRESSURE: 82 MMHG | BODY MASS INDEX: 21.52 KG/M2 | HEART RATE: 67 BPM

## 2021-07-30 LAB
25(OH)D3 SERPL-MCNC: 32 NG/ML
ALBUMIN SERPL ELPH-MCNC: 4.5 G/DL
ALP BLD-CCNC: 128 U/L
ALT SERPL-CCNC: 29 U/L
ANION GAP SERPL CALC-SCNC: 14 MMOL/L
AST SERPL-CCNC: 26 U/L
BASOPHILS # BLD AUTO: 0.03 K/UL
BASOPHILS NFR BLD AUTO: 0.6 %
BILIRUB SERPL-MCNC: 0.4 MG/DL
BUN SERPL-MCNC: 21 MG/DL
CALCIUM SERPL-MCNC: 9.1 MG/DL
CHLORIDE SERPL-SCNC: 102 MMOL/L
CHOLEST SERPL-MCNC: 231 MG/DL
CO2 SERPL-SCNC: 24 MMOL/L
CREAT SERPL-MCNC: 0.9 MG/DL
EOSINOPHIL # BLD AUTO: 0.16 K/UL
EOSINOPHIL NFR BLD AUTO: 3.1 %
ESTIMATED AVERAGE GLUCOSE: 120 MG/DL
GLUCOSE SERPL-MCNC: 94 MG/DL
HBA1C MFR BLD HPLC: 5.8 %
HCT VFR BLD CALC: 43.8 %
HDLC SERPL-MCNC: 50 MG/DL
HGB BLD-MCNC: 14 G/DL
IMM GRANULOCYTES NFR BLD AUTO: 0.2 %
LDLC SERPL CALC-MCNC: 167 MG/DL
LYMPHOCYTES # BLD AUTO: 1.46 K/UL
LYMPHOCYTES NFR BLD AUTO: 28.5 %
MAN DIFF?: NORMAL
MCHC RBC-ENTMCNC: 27.5 PG
MCHC RBC-ENTMCNC: 32 G/DL
MCV RBC AUTO: 85.9 FL
MONOCYTES # BLD AUTO: 0.38 K/UL
MONOCYTES NFR BLD AUTO: 7.4 %
NEUTROPHILS # BLD AUTO: 3.09 K/UL
NEUTROPHILS NFR BLD AUTO: 60.2 %
NONHDLC SERPL-MCNC: 181 MG/DL
PLATELET # BLD AUTO: 233 K/UL
POTASSIUM SERPL-SCNC: 4.5 MMOL/L
PROT SERPL-MCNC: 7.3 G/DL
RBC # BLD: 5.1 M/UL
RBC # FLD: 14.5 %
SODIUM SERPL-SCNC: 140 MMOL/L
TRIGL SERPL-MCNC: 76 MG/DL
TSH SERPL-ACNC: 2.41 UIU/ML
WBC # FLD AUTO: 5.13 K/UL

## 2021-07-30 PROCEDURE — 99213 OFFICE O/P EST LOW 20 MIN: CPT | Mod: GC

## 2021-08-02 DIAGNOSIS — Z00.00 ENCOUNTER FOR GENERAL ADULT MEDICAL EXAMINATION WITHOUT ABNORMAL FINDINGS: ICD-10-CM

## 2021-08-03 NOTE — HISTORY OF PRESENT ILLNESS
[FreeTextEntry1] : follow up.  [de-identified] : Patient is a 50 year old male with history of rectal bleeding secondary to hemorrhoids and anxiety here for follow up visit. \par Pt reports that hemorrhoids bleeding has improved since changing diet and banding by surgery. Reports no active issues. .\par \par

## 2021-08-03 NOTE — ASSESSMENT
[FreeTextEntry1] : # grade I hemorrhoids s/p banding\par - c/w fiber diet\par \par # Anxiety\par - Continue zoloft 75mg. Denies active anxiety, depression symptoms\par \par # Dyslipidemia\par - advised on diet and lifestyle modification \par \par # Pre DM\par - A1c 5.8\par - weight loss, carb restricted diet advised with exercise. \par \par # weight gain ;\par advised wt loss and dietary modification\par \par # Vit D deficiency\par vit d 20: c/w supplements \par \par HCM\par - Colonoscopy in 2/2020. Repeat in 5 years\par - Flu vaccine 1/20/2021\par - Labs before next visit\par - RTC in 6 months \par .

## 2021-09-13 ENCOUNTER — APPOINTMENT (OUTPATIENT)
Dept: SURGERY | Facility: CLINIC | Age: 51
End: 2021-09-13
Payer: COMMERCIAL

## 2021-09-13 VITALS
SYSTOLIC BLOOD PRESSURE: 114 MMHG | BODY MASS INDEX: 29.66 KG/M2 | HEART RATE: 76 BPM | TEMPERATURE: 97.1 F | WEIGHT: 189 LBS | HEIGHT: 67 IN | DIASTOLIC BLOOD PRESSURE: 70 MMHG

## 2021-09-13 PROCEDURE — 99072 ADDL SUPL MATRL&STAF TM PHE: CPT

## 2021-09-13 PROCEDURE — 99213 OFFICE O/P EST LOW 20 MIN: CPT

## 2021-09-21 NOTE — CONSULT LETTER
[Dear  ___] : Dear  [unfilled], [Courtesy Letter:] : I had the pleasure of seeing your patient, [unfilled], in my office today. [Please see my note below.] : Please see my note below. [Consult Closing:] : Thank you very much for allowing me to participate in the care of this patient.  If you have any questions, please do not hesitate to contact me. [FreeTextEntry3] : Sincerely,\par \par Maxwell Calix MD, Colon and Rectal Surgery\par \par [employer] [facility]\par [address] [place] [address]\par [facility], 3rd Floor\par [place], [place] [address]\par Tel [phone] ext [phone]\par Fax [phone]\par

## 2021-09-21 NOTE — ASSESSMENT
[FreeTextEntry1] : 51M with bleeding grade I hemorrhoids\par \par The patient's bleeding has resolved. I recommended he continue with a high fiber and fiber supplement.  We will see him back as needed.

## 2021-09-21 NOTE — PHYSICAL EXAM
[Abdomen Masses] : No abdominal masses [Abdomen Tenderness] : ~T No ~M abdominal tenderness [No HSM] : no hepatosplenomegaly [Excoriation] : no perianal excoriation [Fistula] : no fistulas [Wart] : no warts [Ulcer ___ cm] : no ulcers [Pilonidal Cyst] : no pilonidal cysts [Nonprolapsing] : a nonprolapsing (grade I) [Tender, Swollen] : nontender, non-swollen [Thrombosed] : that was not thrombosed [Skin Tags] : there were no residual hemorrhoidal skin tags seen [Normal] : was normal [None] : there was no rectal mass  [Respiratory Effort] : normal respiratory effort [Normal Rate and Rhythm] : normal rate and rhythm [de-identified] : external examination shows no significant abnormalities [de-identified] : awake, alert and in no acute distress

## 2021-09-21 NOTE — HISTORY OF PRESENT ILLNESS
[FreeTextEntry1] : Patient is a 50M with  PMH of anemia, anxiety and constipation who presents for follow up of bleeding grade I hemorrhoids. He underwent banding on his last visit.  He states he has had minimal bleeding since then. He continues to have daily BM with the aid of fiber. Patient denies fevers, chills, nausea, vomiting, abdominal pain, constipation, diarrhea, blood in his stool or unexpected weight loss.  Patient denies a family history of colon cancer rectal cancer or inflammatory bowel disease.  His last colonoscopy was in 2020 with Dr. Cook that showed diverticulosis and hemorrhoids

## 2021-10-25 ENCOUNTER — APPOINTMENT (OUTPATIENT)
Dept: SURGERY | Facility: CLINIC | Age: 51
End: 2021-10-25
Payer: COMMERCIAL

## 2021-10-25 VITALS
SYSTOLIC BLOOD PRESSURE: 120 MMHG | WEIGHT: 190 LBS | DIASTOLIC BLOOD PRESSURE: 78 MMHG | BODY MASS INDEX: 29.82 KG/M2 | OXYGEN SATURATION: 98 % | TEMPERATURE: 97.9 F | HEIGHT: 67 IN | HEART RATE: 79 BPM

## 2021-10-25 PROCEDURE — 99072 ADDL SUPL MATRL&STAF TM PHE: CPT

## 2021-10-25 PROCEDURE — 99213 OFFICE O/P EST LOW 20 MIN: CPT

## 2021-10-26 NOTE — HISTORY OF PRESENT ILLNESS
[FreeTextEntry1] : Patient is a 50M with  PMH of anemia, anxiety and constipation who presents for follow up of bleeding grade I hemorrhoids. He underwent banding previously.  Since then he has had one episode of a small amount of blood on the toilet tissue. He continues to have daily BM with the aid of fiber. Patient denies fevers, chills, nausea, vomiting, abdominal pain, constipation, diarrhea, blood in his stool or unexpected weight loss.  Patient denies a family history of colon cancer rectal cancer or inflammatory bowel disease.  His last colonoscopy was in 2020 with Dr. Cook that showed diverticulosis and hemorrhoids

## 2021-10-26 NOTE — PHYSICAL EXAM
[Abdomen Masses] : No abdominal masses [Abdomen Tenderness] : ~T No ~M abdominal tenderness [No HSM] : no hepatosplenomegaly [Excoriation] : no perianal excoriation [Fistula] : no fistulas [Wart] : no warts [Ulcer ___ cm] : no ulcers [Pilonidal Cyst] : no pilonidal cysts [Nonprolapsing] : a nonprolapsing (grade I) [Tender, Swollen] : nontender, non-swollen [Thrombosed] : that was not thrombosed [Skin Tags] : there were no residual hemorrhoidal skin tags seen [Normal] : was normal [None] : there was no rectal mass  [Respiratory Effort] : normal respiratory effort [Normal Rate and Rhythm] : normal rate and rhythm [de-identified] : external examination shows no significant abnormalities [de-identified] : awake, alert and in no acute distress

## 2021-10-26 NOTE — ASSESSMENT
[FreeTextEntry1] : 51M with bleeding grade I hemorrhoids\par \par The patient continues to do well. I recommended he continue with a high fiber and fiber supplement.  We will see him back as needed.

## 2022-02-03 DIAGNOSIS — G50.0 TRIGEMINAL NEURALGIA: ICD-10-CM

## 2022-02-03 LAB
25(OH)D3 SERPL-MCNC: 18 NG/ML
ALBUMIN SERPL ELPH-MCNC: 4.7 G/DL
ALP BLD-CCNC: 135 U/L
ALT SERPL-CCNC: 26 U/L
ANION GAP SERPL CALC-SCNC: 16 MMOL/L
AST SERPL-CCNC: 24 U/L
BASOPHILS # BLD AUTO: 0.04 K/UL
BASOPHILS NFR BLD AUTO: 0.7 %
BILIRUB SERPL-MCNC: 0.7 MG/DL
BUN SERPL-MCNC: 20 MG/DL
CALCIUM SERPL-MCNC: 9.4 MG/DL
CHLORIDE SERPL-SCNC: 102 MMOL/L
CHOLEST SERPL-MCNC: 227 MG/DL
CO2 SERPL-SCNC: 22 MMOL/L
CREAT SERPL-MCNC: 0.9 MG/DL
EOSINOPHIL # BLD AUTO: 0.05 K/UL
EOSINOPHIL NFR BLD AUTO: 0.9 %
ESTIMATED AVERAGE GLUCOSE: 114 MG/DL
GLUCOSE SERPL-MCNC: 72 MG/DL
HBA1C MFR BLD HPLC: 5.6 %
HCT VFR BLD CALC: 45.9 %
HDLC SERPL-MCNC: 55 MG/DL
HGB BLD-MCNC: 15 G/DL
IMM GRANULOCYTES NFR BLD AUTO: 0.4 %
LDLC SERPL CALC-MCNC: 162 MG/DL
LYMPHOCYTES # BLD AUTO: 1.53 K/UL
LYMPHOCYTES NFR BLD AUTO: 28 %
MAN DIFF?: NORMAL
MCHC RBC-ENTMCNC: 28.1 PG
MCHC RBC-ENTMCNC: 32.7 G/DL
MCV RBC AUTO: 86.1 FL
MONOCYTES # BLD AUTO: 0.34 K/UL
MONOCYTES NFR BLD AUTO: 6.2 %
NEUTROPHILS # BLD AUTO: 3.48 K/UL
NEUTROPHILS NFR BLD AUTO: 63.8 %
NONHDLC SERPL-MCNC: 172 MG/DL
PLATELET # BLD AUTO: 253 K/UL
POTASSIUM SERPL-SCNC: 4.2 MMOL/L
PROT SERPL-MCNC: 7.5 G/DL
RBC # BLD: 5.33 M/UL
RBC # FLD: 13.3 %
SODIUM SERPL-SCNC: 140 MMOL/L
TRIGL SERPL-MCNC: 76 MG/DL
TSH SERPL-ACNC: 1.96 UIU/ML
WBC # FLD AUTO: 5.46 K/UL

## 2022-02-11 ENCOUNTER — APPOINTMENT (OUTPATIENT)
Dept: INTERNAL MEDICINE | Facility: CLINIC | Age: 52
End: 2022-02-11

## 2022-04-01 ENCOUNTER — APPOINTMENT (OUTPATIENT)
Dept: INTERNAL MEDICINE | Facility: CLINIC | Age: 52
End: 2022-04-01

## 2022-07-05 ENCOUNTER — APPOINTMENT (OUTPATIENT)
Dept: NEUROLOGY | Facility: CLINIC | Age: 52
End: 2022-07-05

## 2022-09-02 ENCOUNTER — APPOINTMENT (OUTPATIENT)
Dept: INTERNAL MEDICINE | Facility: CLINIC | Age: 52
End: 2022-09-02

## 2022-09-02 ENCOUNTER — OUTPATIENT (OUTPATIENT)
Dept: OUTPATIENT SERVICES | Facility: HOSPITAL | Age: 52
LOS: 1 days | Discharge: HOME | End: 2022-09-02

## 2022-09-02 ENCOUNTER — NON-APPOINTMENT (OUTPATIENT)
Age: 52
End: 2022-09-02

## 2022-09-02 VITALS
DIASTOLIC BLOOD PRESSURE: 85 MMHG | WEIGHT: 184 LBS | SYSTOLIC BLOOD PRESSURE: 138 MMHG | OXYGEN SATURATION: 100 % | HEART RATE: 61 BPM | BODY MASS INDEX: 28.88 KG/M2 | HEIGHT: 67 IN

## 2022-09-02 DIAGNOSIS — D64.9 ANEMIA, UNSPECIFIED: ICD-10-CM

## 2022-09-02 DIAGNOSIS — R03.0 ELEVATED BLOOD-PRESSURE READING, W/OUT DIAGNOSIS OF HYPERTENSION: ICD-10-CM

## 2022-09-02 DIAGNOSIS — B35.1 TINEA UNGUIUM: ICD-10-CM

## 2022-09-02 DIAGNOSIS — E55.9 VITAMIN D DEFICIENCY, UNSPECIFIED: ICD-10-CM

## 2022-09-02 DIAGNOSIS — E66.3 OVERWEIGHT: ICD-10-CM

## 2022-09-02 PROCEDURE — 99214 OFFICE O/P EST MOD 30 MIN: CPT | Mod: GC

## 2022-09-06 DIAGNOSIS — D64.9 ANEMIA, UNSPECIFIED: ICD-10-CM

## 2022-09-06 DIAGNOSIS — E66.3 OVERWEIGHT: ICD-10-CM

## 2022-09-06 DIAGNOSIS — E55.9 VITAMIN D DEFICIENCY, UNSPECIFIED: ICD-10-CM

## 2022-09-06 DIAGNOSIS — R37 SEXUAL DYSFUNCTION, UNSPECIFIED: ICD-10-CM

## 2022-09-06 DIAGNOSIS — F41.9 ANXIETY DISORDER, UNSPECIFIED: ICD-10-CM

## 2022-09-06 DIAGNOSIS — R03.0 ELEVATED BLOOD-PRESSURE READING, WITHOUT DIAGNOSIS OF HYPERTENSION: ICD-10-CM

## 2022-09-06 DIAGNOSIS — E78.5 HYPERLIPIDEMIA, UNSPECIFIED: ICD-10-CM

## 2022-09-06 DIAGNOSIS — Z00.00 ENCOUNTER FOR GENERAL ADULT MEDICAL EXAMINATION WITHOUT ABNORMAL FINDINGS: ICD-10-CM

## 2022-09-15 PROBLEM — R03.0 ELEVATED BLOOD PRESSURE READING: Status: ACTIVE | Noted: 2020-02-28

## 2022-09-15 NOTE — ASSESSMENT
[FreeTextEntry1] : # grade I hemorrhoids s/p banding\par - c/w fiber diet\par \par #Sexual Dysfunction\par - Morning erection present\par - Sildenafil prescribed before sexaul encounter\par \par #Onychomycosis\par - Started on Penlac for 3 month\par \par #High BP \par - Recommended lifestyle modification and low salt diet\par \par # Anxiety\par - Continue zoloft 75mg. Denies active anxiety, depression symptoms\par \par # Dyslipidemia\par # weight gain\par - advised on diet and lifestyle modification \par \par # Pre DM\par - A1c 5.8\par - weight loss, carb restricted diet advised with exercise. \par \par # Vit D deficiency\par vit D 20: c/w supplements \par \par HCM\par - Colonoscopy in 2/2020. Repeat in 5 years\par - Flu vaccine 1/20/2021\par - Labs before next visit\par - Optometry referral for glasses\par - RTC in 6 months

## 2022-09-15 NOTE — HISTORY OF PRESENT ILLNESS
[FreeTextEntry1] : follow up. chronic illnesses [de-identified] : Patient is a 50 year old male with history of rectal bleeding secondary to hemorrhoids, anemia and anxiety here for follow up visit. \par Pt reports that hemorrhoids bleeding has improved since changing diet and banding by surgery. Reports no active issues. Patient denies fevers, chills, nausea, vomiting, abdominal pain, constipation, diarrhea, blood in his stool or unexpected weight loss. Patient denies a family history of colon cancer rectal cancer or inflammatory bowel disease.\par \par

## 2022-09-20 ENCOUNTER — APPOINTMENT (OUTPATIENT)
Dept: SURGERY | Facility: CLINIC | Age: 52
End: 2022-09-20

## 2022-09-20 VITALS
OXYGEN SATURATION: 98 % | TEMPERATURE: 97 F | HEIGHT: 67 IN | DIASTOLIC BLOOD PRESSURE: 82 MMHG | SYSTOLIC BLOOD PRESSURE: 120 MMHG | WEIGHT: 185 LBS | BODY MASS INDEX: 29.03 KG/M2 | HEART RATE: 76 BPM

## 2022-09-20 PROCEDURE — 99213 OFFICE O/P EST LOW 20 MIN: CPT

## 2022-09-20 NOTE — ASSESSMENT
[FreeTextEntry1] : 52M with bleeding grade I hemorrhoids\par \par The patient has recurrence of his bleeding. He will return in 2-4 week for repeat banding.

## 2022-09-20 NOTE — HISTORY OF PRESENT ILLNESS
[FreeTextEntry1] : Patient is a 52M with  PMH of anemia, anxiety and constipation who presents for follow up of bleeding grade I hemorrhoids. He underwent banding previously.  He had not had bleeding for some time but now has recurrence of his bleeding. He continues to have daily BM with the aid of fiber. Patient denies fevers, chills, nausea, vomiting, abdominal pain, constipation, diarrhea, blood in his stool or unexpected weight loss.  Patient denies a family history of colon cancer rectal cancer or inflammatory bowel disease.  His last colonoscopy was in 2020 with Dr. Cook that showed diverticulosis and hemorrhoids

## 2022-09-20 NOTE — PHYSICAL EXAM
[Abdomen Masses] : No abdominal masses [Abdomen Tenderness] : ~T No ~M abdominal tenderness [No HSM] : no hepatosplenomegaly [Excoriation] : no perianal excoriation [Fistula] : no fistulas [Wart] : no warts [Ulcer ___ cm] : no ulcers [Pilonidal Cyst] : no pilonidal cysts [Nonprolapsing] : a nonprolapsing (grade I) [Tender, Swollen] : nontender, non-swollen [Thrombosed] : that was not thrombosed [Skin Tags] : there were no residual hemorrhoidal skin tags seen [Normal] : was normal [None] : there was no rectal mass  [Respiratory Effort] : normal respiratory effort [Normal Rate and Rhythm] : normal rate and rhythm [de-identified] : external examination shows no significant abnormalities [de-identified] : awake, alert and in no acute distress

## 2022-10-05 ENCOUNTER — APPOINTMENT (OUTPATIENT)
Dept: OPHTHALMOLOGY | Facility: CLINIC | Age: 52
End: 2022-10-05

## 2022-10-05 ENCOUNTER — OUTPATIENT (OUTPATIENT)
Dept: OUTPATIENT SERVICES | Facility: HOSPITAL | Age: 52
LOS: 1 days | Discharge: HOME | End: 2022-10-05

## 2022-10-05 PROCEDURE — 92014 COMPRE OPH EXAM EST PT 1/>: CPT

## 2022-10-12 ENCOUNTER — APPOINTMENT (OUTPATIENT)
Dept: SURGERY | Facility: CLINIC | Age: 52
End: 2022-10-12

## 2022-10-12 VITALS
HEIGHT: 67 IN | BODY MASS INDEX: 29.03 KG/M2 | SYSTOLIC BLOOD PRESSURE: 106 MMHG | DIASTOLIC BLOOD PRESSURE: 74 MMHG | WEIGHT: 185 LBS | TEMPERATURE: 96.3 F | OXYGEN SATURATION: 99 % | HEART RATE: 77 BPM

## 2022-10-12 PROCEDURE — 46221 LIGATION OF HEMORRHOID(S): CPT

## 2022-10-13 DIAGNOSIS — H04.129 DRY EYE SYNDROME OF UNSPECIFIED LACRIMAL GLAND: ICD-10-CM

## 2022-10-13 DIAGNOSIS — H35.729 SEROUS DETACHMENT OF RETINAL PIGMENT EPITHELIUM, UNSPECIFIED EYE: ICD-10-CM

## 2022-10-13 DIAGNOSIS — H18.529 EPITHELIAL (JUVENILE) CORNEAL DYSTROPHY, UNSPECIFIED EYE: ICD-10-CM

## 2022-10-13 DIAGNOSIS — H52.4 PRESBYOPIA: ICD-10-CM

## 2022-10-13 DIAGNOSIS — H25.099 OTHER AGE-RELATED INCIPIENT CATARACT, UNSPECIFIED EYE: ICD-10-CM

## 2022-10-15 NOTE — HISTORY OF PRESENT ILLNESS
[FreeTextEntry1] : Patient is a 52M with  PMH of anemia, anxiety and constipation who presents for follow up of bleeding grade I hemorrhoids. He underwent banding previously.  He has had recurrent bleeding and presents today for additional bands. He continues to have daily BM with the aid of fiber. Patient denies fevers, chills, nausea, vomiting, abdominal pain, constipation, diarrhea, blood in his stool or unexpected weight loss.  Patient denies a family history of colon cancer rectal cancer or inflammatory bowel disease.  His last colonoscopy was in 2020 with Dr. Cook that showed diverticulosis and hemorrhoids

## 2022-10-15 NOTE — PROCEDURE
[FreeTextEntry1] : RIVERA and anoscopy show normal sphincter tone, large grade I internal hemorrhoids and no masses.\par \par After discussion with the patient about hemorrhoidal banding including all risks benefits and alternatives, we proceeded with banding.  Patient was in agreement with the plan and signed surgical consent.\par \par With the patient in the prone jackknife position, the anoscope was inserted to isolate the right posterolateral and right anterolateral hemorrhoids.  They appeared large and friable with contact bleeding.  Using the disposable suction hemorrhoidal , one band was placed in each location.  This produced some minor bleeding which was self limited.  The patient tolerated the procedure well without pain or dizziness.\par

## 2022-10-15 NOTE — PHYSICAL EXAM
[Abdomen Masses] : No abdominal masses [No HSM] : no hepatosplenomegaly [Abdomen Tenderness] : ~T No ~M abdominal tenderness [Excoriation] : no perianal excoriation [Fistula] : no fistulas [Wart] : no warts [Ulcer ___ cm] : no ulcers [Pilonidal Cyst] : no pilonidal cysts [Nonprolapsing] : a nonprolapsing (grade I) [Tender, Swollen] : nontender, non-swollen [Thrombosed] : that was not thrombosed [Skin Tags] : there were no residual hemorrhoidal skin tags seen [Normal] : was normal [None] : there was no rectal mass  [Respiratory Effort] : normal respiratory effort [Normal Rate and Rhythm] : normal rate and rhythm [de-identified] : external examination shows no significant abnormalities [de-identified] : awake, alert and in no acute distress

## 2022-10-15 NOTE — ASSESSMENT
[FreeTextEntry1] : 52M with bleeding grade I hemorrhoids s/p banding\par \par I had a long conversation with the patient regarding the risks and benefits of banding. He will contact us regarding bleeding or infection.  We will see him back in 1 month.\par

## 2022-11-16 ENCOUNTER — APPOINTMENT (OUTPATIENT)
Dept: SURGERY | Facility: CLINIC | Age: 52
End: 2022-11-16

## 2022-12-21 ENCOUNTER — APPOINTMENT (OUTPATIENT)
Dept: SURGERY | Facility: CLINIC | Age: 52
End: 2022-12-21
Payer: SUBSIDIZED

## 2022-12-21 VITALS
DIASTOLIC BLOOD PRESSURE: 78 MMHG | BODY MASS INDEX: 29.98 KG/M2 | SYSTOLIC BLOOD PRESSURE: 110 MMHG | HEART RATE: 79 BPM | TEMPERATURE: 96.9 F | OXYGEN SATURATION: 98 % | HEIGHT: 67 IN | WEIGHT: 191 LBS

## 2022-12-21 PROCEDURE — 99213 OFFICE O/P EST LOW 20 MIN: CPT

## 2023-01-11 NOTE — ASSESSMENT
[FreeTextEntry1] : 52M with bleeding grade I hemorrhoids\par \par The patient has recurrence of his bleeding. I recommended excisional hemorrhoidectomy since we have not been able to control the bleeding with bands.  He would like to hold off on surgery and proceed with additional bands.  He will return for banding.

## 2023-01-11 NOTE — PHYSICAL EXAM
[Abdomen Masses] : No abdominal masses [Abdomen Tenderness] : ~T No ~M abdominal tenderness [No HSM] : no hepatosplenomegaly [Excoriation] : no perianal excoriation [Fistula] : no fistulas [Wart] : no warts [Ulcer ___ cm] : no ulcers [Pilonidal Cyst] : no pilonidal cysts [Nonprolapsing] : a nonprolapsing (grade I) [Tender, Swollen] : nontender, non-swollen [Thrombosed] : that was not thrombosed [Skin Tags] : there were no residual hemorrhoidal skin tags seen [Normal] : was normal [None] : there was no rectal mass  [Respiratory Effort] : normal respiratory effort [Normal Rate and Rhythm] : normal rate and rhythm [de-identified] : external examination shows no significant abnormalities [de-identified] : awake, alert and in no acute distress

## 2023-02-22 ENCOUNTER — APPOINTMENT (OUTPATIENT)
Dept: SURGERY | Facility: CLINIC | Age: 53
End: 2023-02-22
Payer: COMMERCIAL

## 2023-02-22 VITALS
OXYGEN SATURATION: 99 % | WEIGHT: 186.38 LBS | DIASTOLIC BLOOD PRESSURE: 60 MMHG | HEART RATE: 67 BPM | SYSTOLIC BLOOD PRESSURE: 120 MMHG | TEMPERATURE: 96.4 F | BODY MASS INDEX: 29.25 KG/M2 | HEIGHT: 67 IN

## 2023-02-22 VITALS
TEMPERATURE: 97 F | OXYGEN SATURATION: 98 % | SYSTOLIC BLOOD PRESSURE: 124 MMHG | HEART RATE: 56 BPM | DIASTOLIC BLOOD PRESSURE: 68 MMHG

## 2023-02-22 DIAGNOSIS — K64.0 FIRST DEGREE HEMORRHOIDS: ICD-10-CM

## 2023-02-22 PROCEDURE — 46221 LIGATION OF HEMORRHOID(S): CPT

## 2023-02-23 PROBLEM — K64.0 GRADE I HEMORRHOIDS: Status: ACTIVE | Noted: 2021-04-12

## 2023-03-06 NOTE — PHYSICAL EXAM
[Respiratory Effort] : normal respiratory effort [Normal Rate and Rhythm] : normal rate and rhythm [de-identified] : External examination shows no significant abnormalities. No fissures, fistulas, abscesses, or excoriations. [de-identified] : awake alert, and in no acute distress.

## 2023-03-06 NOTE — HISTORY OF PRESENT ILLNESS
[FreeTextEntry1] : Patient is a 52M with PMH of anemia, anxiety and constipation who presents for follow up of bleeding grade I hemorrhoids. He underwent banding twice previously. Patient presents today with increased bleeding with bowel movements. He would like to undergo additional banding. He continues to have daily BM with the aid of fiber. Patient denies fevers, chills, nausea, vomiting, abdominal pain, constipation, diarrhea, blood in his stool or unexpected weight loss. Patient denies a family history of colon cancer rectal cancer or inflammatory bowel disease. His last colonoscopy was in 2020 with Dr. Cook that showed diverticulosis and hemorrhoids \par  \par

## 2023-03-06 NOTE — PROCEDURE
[FreeTextEntry1] : Digital rectal exam and anoscope show normal sphincter tone, large grade 1 internal hemorrhoids and no masses. \par \par After discussion with the patient about hemorrhoidal banding; including risks and benefits and alternatives, we proceeded with banding. Patient was in agreement with the plan and signed surgical consent. With the patient in the prone jesus-neck position, the anoscope was inserted to isolate the left lateral and right anterolateral hemorrhoids. Using the disposable suction hemorrhoidal , one band was placed in each location. This produced some minor bleeding, which was self-limited. The patient tolerated the procedure well without pain or dizziness.

## 2023-03-06 NOTE — ASSESSMENT
[FreeTextEntry1] : 52-year-old male with bleeding Grade 1 Hemorrhoids S/P Banding x3.\par \par Had a long conversation with the patient regarding risks and benefits of Banding. He will contact us regarding bleeding or infection. We again discussed potential extensional hemorrhoidectomy if this is not successful. We will see him back in 1 month.

## 2023-03-06 NOTE — ADDENDUM
[FreeTextEntry1] : I, Valerie Florian assisted in documentation on 02/22/2023 acting as a scribe for Dr. Maxwell Calix.\par

## 2023-03-21 ENCOUNTER — APPOINTMENT (OUTPATIENT)
Dept: SURGERY | Facility: CLINIC | Age: 53
End: 2023-03-21

## 2023-04-07 ENCOUNTER — APPOINTMENT (OUTPATIENT)
Dept: INTERNAL MEDICINE | Facility: CLINIC | Age: 53
End: 2023-04-07

## 2023-04-07 ENCOUNTER — APPOINTMENT (OUTPATIENT)
Dept: INTERNAL MEDICINE | Facility: CLINIC | Age: 53
End: 2023-04-07
Payer: COMMERCIAL

## 2023-04-07 ENCOUNTER — NON-APPOINTMENT (OUTPATIENT)
Age: 53
End: 2023-04-07

## 2023-04-07 ENCOUNTER — OUTPATIENT (OUTPATIENT)
Dept: OUTPATIENT SERVICES | Facility: HOSPITAL | Age: 53
LOS: 1 days | End: 2023-04-07
Payer: COMMERCIAL

## 2023-04-07 VITALS
WEIGHT: 183.25 LBS | TEMPERATURE: 97.5 F | SYSTOLIC BLOOD PRESSURE: 115 MMHG | DIASTOLIC BLOOD PRESSURE: 79 MMHG | HEART RATE: 76 BPM | BODY MASS INDEX: 28.76 KG/M2 | HEIGHT: 67 IN | OXYGEN SATURATION: 98 %

## 2023-04-07 DIAGNOSIS — R74.8 ABNORMAL LEVELS OF OTHER SERUM ENZYMES: ICD-10-CM

## 2023-04-07 DIAGNOSIS — Z00.00 ENCOUNTER FOR GENERAL ADULT MEDICAL EXAMINATION WITHOUT ABNORMAL FINDINGS: ICD-10-CM

## 2023-04-07 PROCEDURE — 99213 OFFICE O/P EST LOW 20 MIN: CPT

## 2023-04-07 PROCEDURE — 99213 OFFICE O/P EST LOW 20 MIN: CPT | Mod: GC

## 2023-04-14 LAB
25(OH)D3 SERPL-MCNC: 32 NG/ML
ALBUMIN SERPL ELPH-MCNC: 5.1 G/DL
ALP BLD-CCNC: 126 U/L
ALT SERPL-CCNC: 22 U/L
ANION GAP SERPL CALC-SCNC: 13 MMOL/L
AST SERPL-CCNC: 22 U/L
BASOPHILS # BLD AUTO: 0.03 K/UL
BASOPHILS NFR BLD AUTO: 0.7 %
BILIRUB SERPL-MCNC: 0.6 MG/DL
BUN SERPL-MCNC: 20 MG/DL
CALCIUM SERPL-MCNC: 9.7 MG/DL
CHLORIDE SERPL-SCNC: 107 MMOL/L
CHOLEST SERPL-MCNC: 241 MG/DL
CO2 SERPL-SCNC: 24 MMOL/L
CREAT SERPL-MCNC: 0.9 MG/DL
CREAT SPEC-SCNC: 286 MG/DL
EGFR: 101 ML/MIN/1.73M2
EOSINOPHIL # BLD AUTO: 0.06 K/UL
EOSINOPHIL NFR BLD AUTO: 1.4 %
GLUCOSE SERPL-MCNC: 86 MG/DL
HCT VFR BLD CALC: 45.2 %
HDLC SERPL-MCNC: 51 MG/DL
HGB BLD-MCNC: 15 G/DL
IMM GRANULOCYTES NFR BLD AUTO: 0.5 %
LDLC SERPL CALC-MCNC: 173 MG/DL
LYMPHOCYTES # BLD AUTO: 1.23 K/UL
LYMPHOCYTES NFR BLD AUTO: 28.1 %
MAN DIFF?: NORMAL
MCHC RBC-ENTMCNC: 28 PG
MCHC RBC-ENTMCNC: 33.2 G/DL
MCV RBC AUTO: 84.5 FL
MICROALBUMIN 24H UR DL<=1MG/L-MCNC: 1.6 MG/DL
MICROALBUMIN/CREAT 24H UR-RTO: 6 MG/G
MONOCYTES # BLD AUTO: 0.27 K/UL
MONOCYTES NFR BLD AUTO: 6.2 %
NEUTROPHILS # BLD AUTO: 2.77 K/UL
NEUTROPHILS NFR BLD AUTO: 63.1 %
NONHDLC SERPL-MCNC: 190 MG/DL
PLATELET # BLD AUTO: 241 K/UL
POTASSIUM SERPL-SCNC: 4.3 MMOL/L
PROT SERPL-MCNC: 7.8 G/DL
RBC # BLD: 5.35 M/UL
RBC # FLD: 13.4 %
SODIUM SERPL-SCNC: 144 MMOL/L
TRIGL SERPL-MCNC: 87 MG/DL
TSH SERPL-ACNC: 2.28 UIU/ML
WBC # FLD AUTO: 4.38 K/UL

## 2023-04-14 NOTE — HISTORY OF PRESENT ILLNESS
[de-identified] : Patient is a 52 year old male with history of rectal bleeding secondary to hemorrhoids, anemia and anxiety here for follow up visit. Pt reports his hemorrhoids are fixed after banding and reports no more bleeding. Denies any new complaints

## 2023-04-16 ENCOUNTER — EMERGENCY (EMERGENCY)
Facility: HOSPITAL | Age: 53
LOS: 0 days | Discharge: HOME | End: 2023-04-17
Attending: EMERGENCY MEDICINE
Payer: SELF-PAY

## 2023-04-16 VITALS
HEART RATE: 73 BPM | DIASTOLIC BLOOD PRESSURE: 78 MMHG | TEMPERATURE: 98 F | RESPIRATION RATE: 18 BRPM | SYSTOLIC BLOOD PRESSURE: 138 MMHG | OXYGEN SATURATION: 99 %

## 2023-04-16 DIAGNOSIS — F41.9 ANXIETY DISORDER, UNSPECIFIED: ICD-10-CM

## 2023-04-16 DIAGNOSIS — F41.1 GENERALIZED ANXIETY DISORDER: ICD-10-CM

## 2023-04-16 DIAGNOSIS — F42.9 OBSESSIVE-COMPULSIVE DISORDER, UNSPECIFIED: ICD-10-CM

## 2023-04-16 DIAGNOSIS — Z20.822 CONTACT WITH AND (SUSPECTED) EXPOSURE TO COVID-19: ICD-10-CM

## 2023-04-16 LAB
ALBUMIN SERPL ELPH-MCNC: 4.7 G/DL — SIGNIFICANT CHANGE UP (ref 3.5–5.2)
ALP SERPL-CCNC: 123 U/L — HIGH (ref 30–115)
ALT FLD-CCNC: 16 U/L — SIGNIFICANT CHANGE UP (ref 0–41)
ANION GAP SERPL CALC-SCNC: 11 MMOL/L — SIGNIFICANT CHANGE UP (ref 7–14)
APAP SERPL-MCNC: <5 UG/ML — LOW (ref 10–30)
APPEARANCE UR: CLEAR — SIGNIFICANT CHANGE UP
AST SERPL-CCNC: 18 U/L — SIGNIFICANT CHANGE UP (ref 0–41)
BASOPHILS # BLD AUTO: 0.03 K/UL — SIGNIFICANT CHANGE UP (ref 0–0.2)
BASOPHILS NFR BLD AUTO: 0.5 % — SIGNIFICANT CHANGE UP (ref 0–1)
BILIRUB DIRECT SERPL-MCNC: <0.2 MG/DL — SIGNIFICANT CHANGE UP (ref 0–0.3)
BILIRUB INDIRECT FLD-MCNC: >0.4 MG/DL — SIGNIFICANT CHANGE UP (ref 0.2–1.2)
BILIRUB SERPL-MCNC: 0.6 MG/DL — SIGNIFICANT CHANGE UP (ref 0.2–1.2)
BILIRUB UR-MCNC: NEGATIVE — SIGNIFICANT CHANGE UP
BUN SERPL-MCNC: 15 MG/DL — SIGNIFICANT CHANGE UP (ref 10–20)
CALCIUM SERPL-MCNC: 9.8 MG/DL — SIGNIFICANT CHANGE UP (ref 8.4–10.5)
CHLORIDE SERPL-SCNC: 102 MMOL/L — SIGNIFICANT CHANGE UP (ref 98–110)
CO2 SERPL-SCNC: 22 MMOL/L — SIGNIFICANT CHANGE UP (ref 17–32)
COLOR SPEC: YELLOW — SIGNIFICANT CHANGE UP
CREAT SERPL-MCNC: 0.8 MG/DL — SIGNIFICANT CHANGE UP (ref 0.7–1.5)
DIFF PNL FLD: NEGATIVE — SIGNIFICANT CHANGE UP
EGFR: 105 ML/MIN/1.73M2 — SIGNIFICANT CHANGE UP
EOSINOPHIL # BLD AUTO: 0.02 K/UL — SIGNIFICANT CHANGE UP (ref 0–0.7)
EOSINOPHIL NFR BLD AUTO: 0.3 % — SIGNIFICANT CHANGE UP (ref 0–8)
ETHANOL SERPL-MCNC: <10 MG/DL — SIGNIFICANT CHANGE UP
GLUCOSE SERPL-MCNC: 89 MG/DL — SIGNIFICANT CHANGE UP (ref 70–99)
GLUCOSE UR QL: NEGATIVE — SIGNIFICANT CHANGE UP
HCT VFR BLD CALC: 48.1 % — SIGNIFICANT CHANGE UP (ref 42–52)
HGB BLD-MCNC: 16.3 G/DL — SIGNIFICANT CHANGE UP (ref 14–18)
IMM GRANULOCYTES NFR BLD AUTO: 0.2 % — SIGNIFICANT CHANGE UP (ref 0.1–0.3)
KETONES UR-MCNC: NEGATIVE — SIGNIFICANT CHANGE UP
LEUKOCYTE ESTERASE UR-ACNC: NEGATIVE — SIGNIFICANT CHANGE UP
LYMPHOCYTES # BLD AUTO: 1.18 K/UL — LOW (ref 1.2–3.4)
LYMPHOCYTES # BLD AUTO: 18 % — LOW (ref 20.5–51.1)
MCHC RBC-ENTMCNC: 28.6 PG — SIGNIFICANT CHANGE UP (ref 27–31)
MCHC RBC-ENTMCNC: 33.9 G/DL — SIGNIFICANT CHANGE UP (ref 32–37)
MCV RBC AUTO: 84.5 FL — SIGNIFICANT CHANGE UP (ref 80–94)
MONOCYTES # BLD AUTO: 0.4 K/UL — SIGNIFICANT CHANGE UP (ref 0.1–0.6)
MONOCYTES NFR BLD AUTO: 6.1 % — SIGNIFICANT CHANGE UP (ref 1.7–9.3)
NEUTROPHILS # BLD AUTO: 4.9 K/UL — SIGNIFICANT CHANGE UP (ref 1.4–6.5)
NEUTROPHILS NFR BLD AUTO: 74.9 % — SIGNIFICANT CHANGE UP (ref 42.2–75.2)
NITRITE UR-MCNC: NEGATIVE — SIGNIFICANT CHANGE UP
NRBC # BLD: 0 /100 WBCS — SIGNIFICANT CHANGE UP (ref 0–0)
PH UR: 6.5 — SIGNIFICANT CHANGE UP (ref 5–8)
PLATELET # BLD AUTO: 207 K/UL — SIGNIFICANT CHANGE UP (ref 130–400)
PMV BLD: 10.3 FL — SIGNIFICANT CHANGE UP (ref 7.4–10.4)
POTASSIUM SERPL-MCNC: 4.2 MMOL/L — SIGNIFICANT CHANGE UP (ref 3.5–5)
POTASSIUM SERPL-SCNC: 4.2 MMOL/L — SIGNIFICANT CHANGE UP (ref 3.5–5)
PROT SERPL-MCNC: 7.3 G/DL — SIGNIFICANT CHANGE UP (ref 6–8)
PROT UR-MCNC: SIGNIFICANT CHANGE UP
RBC # BLD: 5.69 M/UL — SIGNIFICANT CHANGE UP (ref 4.7–6.1)
RBC # FLD: 13.5 % — SIGNIFICANT CHANGE UP (ref 11.5–14.5)
SALICYLATES SERPL-MCNC: <0.3 MG/DL — LOW (ref 4–30)
SARS-COV-2 RNA SPEC QL NAA+PROBE: SIGNIFICANT CHANGE UP
SODIUM SERPL-SCNC: 135 MMOL/L — SIGNIFICANT CHANGE UP (ref 135–146)
SP GR SPEC: 1.03 — HIGH (ref 1.01–1.03)
UROBILINOGEN FLD QL: ABNORMAL
WBC # BLD: 6.54 K/UL — SIGNIFICANT CHANGE UP (ref 4.8–10.8)
WBC # FLD AUTO: 6.54 K/UL — SIGNIFICANT CHANGE UP (ref 4.8–10.8)

## 2023-04-16 PROCEDURE — 87635 SARS-COV-2 COVID-19 AMP PRB: CPT

## 2023-04-16 PROCEDURE — 80076 HEPATIC FUNCTION PANEL: CPT

## 2023-04-16 PROCEDURE — 85025 COMPLETE CBC W/AUTO DIFF WBC: CPT

## 2023-04-16 PROCEDURE — 80048 BASIC METABOLIC PNL TOTAL CA: CPT

## 2023-04-16 PROCEDURE — 80307 DRUG TEST PRSMV CHEM ANLYZR: CPT

## 2023-04-16 PROCEDURE — 36415 COLL VENOUS BLD VENIPUNCTURE: CPT

## 2023-04-16 PROCEDURE — 81003 URINALYSIS AUTO W/O SCOPE: CPT

## 2023-04-16 PROCEDURE — 93010 ELECTROCARDIOGRAM REPORT: CPT

## 2023-04-16 PROCEDURE — 99284 EMERGENCY DEPT VISIT MOD MDM: CPT

## 2023-04-16 PROCEDURE — 99284 EMERGENCY DEPT VISIT MOD MDM: CPT | Mod: 25

## 2023-04-16 PROCEDURE — 93005 ELECTROCARDIOGRAM TRACING: CPT

## 2023-04-16 NOTE — ED ADULT NURSE NOTE - CAS EDP DISCH TYPE
Called patient to schedule pharmacist appointment to discuss medications for Diabetes Management Program.     No answer. Left VM on home/cell TAD: Please call back at [phone] Option #7 to retrieve the above message. Sent poLight. Tsering Perera Aurelia.   Pharmacy [facility] [address]  Department, toll free: [phone], option 7 Home

## 2023-04-16 NOTE — ED ADULT TRIAGE NOTE - CHIEF COMPLAINT QUOTE
Pt here reports hx of anxiety currently on sertraline by PMD states "its not working" denies etoh, illicit. Pt states does not currently see psychiatrist but would like to . denies SI/ HI/ AH/VH. calm cooperative  . pt with spouse present.

## 2023-04-16 NOTE — ED ADULT NURSE NOTE - NS_BH TRG QUESTION4_ED_ALL_ED
ONCOLOGY/HEMATOLOGY OUTPATIENT CONSULTATION    Patient: Elle Redd  MRN: [id number]  YOB: 1969  Date of Visit: 3/20/2020         CLINIC:  [facility]  [address] [place] [address] AVE  [place] [id number] [phone]    Note Type:  Consult    REQUESTING PHYSICIAN: Emerson Graham MD    CHIEF COMPLAINT: Follicular lymphoma     HISTORY OF PRESENT ILLNESS:  Ms. Elle Redd was seen at [facility] for hematology/medical oncology consultation for the above reason.  This consult was personally requested by Emerson Graham MD She has the following hematological/oncological history:    #1. This is a 50 year old a history of laparoscopic sleeve gastrectomy, was being evaluated for anemia by the GI service.  Patient had had no unexplained weight loss.  She has had intentional weight loss post gastric sleeve operation.  There was no history of any recurrent infections, fevers or night sweats.  She does have hot flashes and is perimenopausal.  She also continues with her menstrual cycle.      She had an EGD August 6, 2019 which noted erythema in the gastric antrum, changes of the gastric lumen consistent with sleeve gastrectomy.  Examination was August 6, 2019.   She also had a colonoscopy August 6, 2019 which noted small nonbleeding internal hemorrhoids and scattered diverticuli present in the colon.    #2.  Patient had a CT enterography abdomen and pelvis with contrast December 3, 2019 which noted a 12 mm lesion adjacent to the pancreas concerning for inflamed lymph node or neoplasm.  A mildly prominent mesenteric lymph nodes measuring up to 12 mm.       On January 31, 2020 she had a MRI of the abdomen with and without contrast which noted a large mesenteric lymph node measuring 15 mm in short axis dimension increased from 12 mm on prior CT.  Pancreas was normal with no pancreatic ductal dilatation.        March 12, 2020 she had EGD and EUS with Dr. Graham, this noted a 10 x 11 mm  hypoechoic lesion identified in the body of the pancreas and 2 other lesions identified in the vicinity that there was smaller in size.  An FNA was performed    Component  Resulting Agency   Cytologic Interpretation   Pancreas, FNA and core biopsies:   -Follicular B-cell lymphoma, see comment.   CD 20 positive, CD 10 positive, Ki-67 variable ranging 10-30%.  BCL6 positive BCL 2 positive    #3.  CBC with differential January 30, 2020 noted white count of 3.8, 44% neutrophils, 43% lymphocytes, hemoglobin was 11.9 MCV of 84, platelets were 311,000.    Iron studies from January 30, 2020 noted a ferritin of 10, serum iron was 77, iron saturation was 19%, TIBC was 4 O2    #4.  Family history significant for father age 80 years has a history of superficial skin cancer, prostate cancer and cancer of the jaw status post resection and reconstruction.    Past Medical History:   Diagnosis Date   • Allergy    • Anxiety    • Depressive disorder    • Thyroid condition        Past Surgical History:   Procedure Laterality Date   • Appendectomy     • Bariatric laparoscopic sleeve self pay     • Bariatric surgery     • Colonoscopy  08/06/2019   • Esophagogastroduodenoscopy  08/06/2019   • Esophagogastroduodenoscopy transoral flex diag  04/19/2018    [facility]i, inpatient, MAC, for stent removal   • Esophagogastroduodenoscopy transoral flex diag N/A 8/6/2019    EGD (ESOPHAGOGASTRODUODENOSCOPY) performed by Ash Mcdonough MD at [facility]   • Rotator cuff repair      right   • Tonsillectomy and adenoidectomy         ALLERGIES:  Cat dander; Dog dander; and Seasonal    MEDICATIONS:  Current Outpatient Medications   Medication Sig Dispense Refill   • Ferrous Sulfate (IRON) 325 (65 Fe) MG Tab Take 1 tablet by mouth daily. 30 tablet 0   • citalopram (CELEXA) 40 MG tablet Take 1 tablet by mouth daily. 90 tablet 3   • levothyroxine (SYNTHROID, LEVOTHROID) 75 MCG tablet Take 1 tablet by mouth daily. 90 tablet 4   • amitriptyline (ELAVIL) 10 MG tablet  Take 1 tablet by mouth nightly. 30 tablet 0   • Paragard Intrauterine Copper IUD Inserted 8/2014       No current facility-administered medications for this visit.        Social History     Socioeconomic History   • Marital status: /Civil Union     Spouse name: Not on file   • Number of children: Not on file   • Years of education: Not on file   • Highest education level: Not on file   Occupational History   • Not on file   Social Needs   • Financial resource strain: Not on file   • Food insecurity:     Worry: Not on file     Inability: Not on file   • Transportation needs:     Medical: Not on file     Non-medical: Not on file   Tobacco Use   • Smoking status: Never Smoker   • Smokeless tobacco: Never Used   Substance and Sexual Activity   • Alcohol use: No     Frequency: Never     Drinks per session: 1 or 2     Binge frequency: Never   • Drug use: No   • Sexual activity: Yes     Partners: Male     Birth control/protection: I.U.D.   Lifestyle   • Physical activity:     Days per week: Not on file     Minutes per session: Not on file   • Stress: Not on file   Relationships   • Social connections:     Talks on phone: Not on file     Gets together: Not on file     Attends Yarsanism service: Not on file     Active member of club or organization: Not on file     Attends meetings of clubs or organizations: Not on file     Relationship status: Not on file   • Intimate partner violence:     Fear of current or ex partner: Not on file     Emotionally abused: Not on file     Physically abused: Not on file     Forced sexual activity: Not on file   Other Topics Concern   • Not on file   Social History Narrative   • Not on file   She is a guidance counselor in middle school.    Family History   Problem Relation Age of Onset   • Depression Mother    • High blood pressure Mother    • Obesity Mother    • Hypertension Mother    • Arthritis Mother    • Cancer Father    • Arthritis Father        REVIEW OF SYSTEMS:   The review of  systems from the medical assistant was reviewed. A 12 point review of systems was performed independently and pertinent positives and negatives are noted in the history of present illness.          PHYSICAL EXAMINATION:    The patient is a 50 year old female in no apparent distress.  VITALS:   Visit Vitals  LMP 04/01/2019      Visit Vitals  /64 (BP Location: RUE - Right upper extremity, Patient Position: Sitting, Cuff Size: Regular)   Pulse 76   Temp 98.1 °F (36.7 °C) (Oral)   Resp 16   Ht 5' 3\" (1.6 m)   Wt 85.3 kg   LMP 02/01/2020 (Exact Date)   BMI 33.32 kg/m²       Pain scale reviewed as noted in the nursing assessment  ECOG: As noted in EMR (electronic medical record).  HEENT:  Pupils equally round, with extraocular movements intact.  Anicteric sclerae. Oral membranes moist with no oral lesions.  NECK:  Supple with no masses palpated.   LUNGS:  Clear to auscultation bilaterally.  There is no evidence of wheezing or rales on auscultation.  No evidence of increased work of breathing   CARDIOVASCULAR:  Regular rate and rhythm.  No murmurs.   BACK:  There is no spinal tenderness on palpation of posterior spine.  ABDOMEN:  Soft, nontender, no hepatosplenomegaly.  Bowel sounds are normal.  No abnormal masses are palpable.  EXTREMITIES:  No cyanosis, clubbing or edema.  LYMPHATIC SYSTEM:  There is no cervical, supraclavicular, axillary or inguinal lymphadenopathy.   SKIN:  No bruises or petechiae seen.  NEUROLOGIC:  Cranial nerves grossly intact.  Normal gait and can mount the exam table without assistance.  PSYCHIATRIC:  Appears to have normal insight, mood and affect.     LABS:  Lab Services on 03/20/2020   Component Date Value Ref Range Status   • WBC 03/20/2020 4.4  4.2 - 11.0 K/mcL Final   • RBC 03/20/2020 4.66  4.00 - 5.20 mil/mcL Final   • HGB 03/20/2020 13.2  12.0 - 15.5 g/dL Final   • HCT 03/20/2020 39.9  36.0 - 46.5 % Final   • MCV 03/20/2020 85.6  78.0 - 100.0 fl Final   • MCH 03/20/2020 28.3  26.0 -  34.0 pg Final   • MCHC 03/20/2020 33.1  32.0 - 36.5 g/dL Final   • RDW-SD 03/20/2020 39.3  39.0 - 50.0 fL Final   • RDW-CV 03/20/2020 12.7  11.0 - 15.0 % Final   • PLT 03/20/2020 255  140 - 450 K/mcL Final   • Neutrophil, Percent 03/20/2020 55  % Final   • Lymphocytes, Percent 03/20/2020 36  % Final   • Mono, Percent 03/20/2020 6  % Final   • Eosinophils, Percent 03/20/2020 2  % Final   • Basophils, Percent 03/20/2020 1  % Final   • Absolute Neutrophils 03/20/2020 2.4  1.8 - 7.7 K/mcL Final   • Absolute Lymphocytes 03/20/2020 1.6  1.0 - 4.0 K/mcL Final   • Absolute Monocytes 03/20/2020 0.3  0.3 - 0.9 K/mcL Final   • Absolute Eosinophils  03/20/2020 0.1  0.1 - 0.5 K/mcL Final   • Absolute Basophils 03/20/2020 0.0  0.0 - 0.3 K/mcL Final   Admission on 03/12/2020, Discharged on 03/12/2020   Component Date Value Ref Range Status   • Case Report 03/12/2020    Final                    Value:Non-Gynecolgical Cytology                         Case: [id number]                                  Authorizing Provider:  Emerson Graham MD      Collected:           03/12/2020 1345              Ordering Location:     [facility]                Received:            03/12/2020 1427                                     Gastrointestinal (GI)                                                                               Center                                                                       Pathologist:           Zuleika Zayas MD                                                          Specimen:    Pancreas, pancreatic leision                                                              • Cytologic Interpretation 03/12/2020    Final                    Value:This result contains rich text formatting which cannot be displayed here.   • Comment 03/12/2020    Final                    Value:This result contains rich text formatting which cannot be displayed here.   • Gross Description 03/12/2020    Final                    Value:This  result contains rich text formatting which cannot be displayed here.   • Intraoperative Consultation 03/12/2020    Final                    Value:This result contains rich text formatting which cannot be displayed here.   • Microscopic Description 03/12/2020    Final                    Value:This result contains rich text formatting which cannot be displayed here.   • Disclaimer 03/12/2020    Final                    Value:This result contains rich text formatting which cannot be displayed here.   • Case Report 03/12/2020    Final                    Value:Flow Cytometry                                    Case: [id number]                                 Authorizing Provider:  Emerson Graham MD      Collected:           03/12/2020 1346              Ordering Location:     [facility]                Received:            03/12/2020 1404                                     Gastrointestinal (GI)                                                                               Center                                                                       Pathologist:           Sherry Asher MD                                                          Specimen:    Pancreas, pancreatic leision                                                              • Pathologic Diagnosis 03/12/2020    Final                    Value:This result contains rich text formatting which cannot be displayed here.   • Diagnosis Comment 03/12/2020    Final                    Value:This result contains rich text formatting which cannot be displayed here.   • Disclaimer 03/12/2020    Final                    Value:This result contains rich text formatting which cannot be displayed here.           RADIOLOGICAL DATA:  As noted in the history of present illness    PATHOLOGICAL DATA:  As noted in the history of present illness    ASSESSMENT:  1. Follicular lymphoma patient has presented with no B symptoms.  2. Iron deficiency anemia with gastric sleeve  operation.    PLAN/RECOMMENDATIONS:  1. I have discussed the diagnosis of non-Hodgkin's lymphoma colon follicular lymphoma.  I had discussed the case with the pathologist which is favoring grade 1/2 (however due to scarcity of tissue this could not be definitively noted).  She has non bulky lymph nodes noted in the mesentery.  2. We discussed natural history and prognosis including the room usual case of indolent nature of these lymphomas.  We discussed indications of treatment.  3. I would like to obtain laboratory testing such as LDH, beta 2 microglobulin, CBC, CMP and obtain a CT scan of the chest.  This will help obtain a FLIPI score.  I have discussed indications for a PET-CT scan.  Discussed the role of bone marrow biopsy.  Given the recent COVID  19  risks/infection the patient will be called in a telephone encounter to review imaging and laboratory testing.  4. Obtain repeat CBC with differential.  He she was counseled on a iron deficiency but it appears that she is responding to oral iron and she should continue with this.  Obtain vitamin B12 and vitamin D hydroxy level.  5. She has requested a thyroid function studies.  Patient is to call earlier if any questions or concerns.        NCCN (National Comprehensive Cancer Network) guidelines were consulted in formulation of management plan.    Discussed with patient the natural history, course and prognosis of illness.    Therapeutic options discussed and explained.  Side effects, risks and benefits, and alternatives discussed.  Patient acknowledges understanding of disease and treatment plan.    Love Pettit MD FACP             Emerson Graham MD   Cc  Alexandra La MD     No

## 2023-04-16 NOTE — ED ADULT NURSE NOTE - NSIMPLEMENTINTERV_GEN_ALL_ED
Implemented All Universal Safety Interventions:  [place] to call system. Call bell, personal items and telephone within reach. Instruct patient to call for assistance. Room bathroom lighting operational. Non-slip footwear when patient is off stretcher. Physically safe environment: no spills, clutter or unnecessary equipment. Stretcher in lowest position, wheels locked, appropriate side rails in place.

## 2023-04-17 VITALS
DIASTOLIC BLOOD PRESSURE: 55 MMHG | HEART RATE: 64 BPM | SYSTOLIC BLOOD PRESSURE: 115 MMHG | RESPIRATION RATE: 18 BRPM | TEMPERATURE: 98 F | OXYGEN SATURATION: 99 %

## 2023-04-17 DIAGNOSIS — F42.9 OBSESSIVE-COMPULSIVE DISORDER, UNSPECIFIED: ICD-10-CM

## 2023-04-17 DIAGNOSIS — F41.9 ANXIETY DISORDER, UNSPECIFIED: ICD-10-CM

## 2023-04-17 PROCEDURE — 90792 PSYCH DIAG EVAL W/MED SRVCS: CPT | Mod: 95

## 2023-04-17 RX ORDER — SERTRALINE 25 MG/1
1 TABLET, FILM COATED ORAL
Qty: 14 | Refills: 0
Start: 2023-04-17 | End: 2023-04-30

## 2023-04-17 RX ADMIN — Medication 1 MILLIGRAM(S): at 03:41

## 2023-04-17 NOTE — ED PROVIDER NOTE - PHYSICAL EXAMINATION
VITAL SIGNS: I have reviewed nursing notes and confirm.  CONSTITUTIONAL: Well-developed; well-nourished; in no acute distress  SKIN: Skin exam is warm and dry, no acute rash.  HEAD: Normocephalic; atraumatic.  EYES: Conjunctiva and sclera clear.  ENT: No nasal discharge; airway clear.  CARD: S1, S2 normal; no murmurs, gallops, or rubs. Regular rate and rhythm.  RESP: No wheezes, rales or rhonchi. Speaking in full sentences.   EXT: Normal ROM. No clubbing, cyanosis or edema.  NEURO: Alert, oriented. Grossly unremarkable. No focal deficits.   PSYCH: Cooperative, appropriate. Denies SI/HI/hallucinations.

## 2023-04-17 NOTE — ED BEHAVIORAL HEALTH NOTE - BEHAVIORAL HEALTH NOTE
==================  PRE-HOSPITAL COURSE  ===================  SOURCE: PA and secondhand ED documentation  DETAILS: BIB self for worsening anxiety.  =========  ED COURSE  =========  SOURCE:  PA and secondhand ED documentation.  ARRIVAL:  Per chart, patient arrived via walk in accompanied by spouse. Per chart, patient was calm upon arrival, and cooperative with triage process.  BELONGINGS:  Per chart, patient arrived with belongings. All belongings were provided to hospital security, and patient currently in a gown with a 1:1 staff member.  BEHAVIOR: PA described patient to be anxious, tearful, and cooperative, presenting with linear thought process, AAOx3, presenting with anxious mood and appropriate affect, remains in behavioral and impulse control, is not violent/aggressive. PA stated that the patient is not endorsing SI/HI/A/VH. PA stated that there are no visible marks, bruises, or lacerations on the body. RN stated that the patient appears to be well-groomed, maintains good hygiene.  TREATMENT:  Per chart and PA, patient did not receive PRN medications.   VISITORS:  Per PA, wife at bedside.         COVID Exposure Screen- collateral (i.e. third-party, chart review, belongings, etc; include EMS and ED staff)   ---------------------------------------------------  1. Has the patient had a COVID-19 test in the last 90 days? Unknown.   2. Has the patient tested positive for COVID-19 antibodies? Unknown.   3. Has the patient received 2 doses of the COVID-19 vaccine? Unknown  4. In the past 10 days, has the patient been around anyone with a positive COVID-19 test?* Unknown.   5. Has the patient been out of [place] within the past 10 days? Unknown

## 2023-04-17 NOTE — ED PROVIDER NOTE - CLINICAL SUMMARY MEDICAL DECISION MAKING FREE TEXT BOX
Endorsed from Dr. Ramirez  53 yo man w/ anxiety here requesting psych  no SI/HI    Seen by telepsych  Recommended increased zoloft dose  PRN ativan  has f/u as outpatient

## 2023-04-17 NOTE — ED PROVIDER NOTE - NS ED ATTENDING STATEMENT MOD
This was a shared visit with the MAKSIM. I reviewed and verified the documentation and independently performed the documented:

## 2023-04-17 NOTE — ED PROVIDER NOTE - NSFOLLOWUPCLINICS_GEN_ALL_ED_FT
[facility]  [facility]  [address] [place] [id number]  Phone: [phone]  Fax:   Follow Up Time: 1-3 Days

## 2023-04-17 NOTE — ED PROVIDER NOTE - PATIENT PORTAL LINK FT
You can access the FollowMyHealth Patient Portal offered by [facility] by registering at the following website: [email or web address]. By joining [employer]’s FollowMyHealth portal, you will also be able to view your health information using other applications (apps) compatible with our system.

## 2023-04-17 NOTE — ED BEHAVIORAL HEALTH NOTE - BEHAVIORAL HEALTH NOTE
“Collateral (Name, relationship to patient) has requested that the information provided remain confidential: Yes [  ] No [  ]    Collateral (Name, relationship to patient) has provided information that patient is/may be unaware of: Yes [  ] No [  ]”          “Patient gives permission to obtain collateral from _____:    (  ) Yes    (  )  No    Rationale for overriding objection              (  ) Lack of capacity. Details: ________              (  ) Assessing risk of danger to self/others. Details: ________          Rationale for selecting specific collateral source              (  ) Potential to impact risk of danger to self/others and no alternative equivalent. Details: _____”             ========================    FOR EACH COLLATERAL    ========================    NAME:     NUMBER:     RELATIONSHIP:     RELIABILITY:     COMMENTS: Collateral denies concern for imminent risk. Denies acute safety concerns in the home. Collateral requested outpatient referrals for pt.     ========================    PATIENT DEMOGRAPHICS:    ========================    HPI    BASELINE FUNCTIONING:    DATE HPI STARTED:    DECOMPENSATION:    SUICIDALITY    VIOLENCE:    SUBSTANCE:    ========================    PAST PSYCHIATRIC HISTORY    ========================    DATE PAST PSYCHIATRIC HISTORY STARTED:     MAIN PSYCHIATRIC DIAGNOSIS:    PSYCHIATRIC HOSPITALIZATIONS:    PRIOR ILLNESS:    SUICIDALITY:    VIOLENCE:    SUBSTANCE USE:    ==============    OTHER HISTORY    ==============    CURRENT MEDICATION:    MEDICAL HISTORY:    ALLERGIES    LEGAL ISSUES:    FIREARM ACCESS:    SOCIAL HISTORY:    FAMILY HISTORY:    DEVELOPMENTAL HISTORY: Collateral (Aissatou Maravilla, spouse) has requested that the information provided remain confidential: Yes [  ] No [ x ]    Collateral (Aissatou Maravilla, spouse) has provided information that patient is/may be unaware of: Yes [  ] No [ x ]        Patient gives permission to obtain collateral from Aissatou Maravilla, spouse:     ( x ) Yes    (  )  No    Rationale for overriding objection    (  ) Lack of capacity. Details: ________    (  ) Assessing risk of danger to self/others. Details: ________          Rationale for selecting specific collateral source    ( x ) Potential to impact risk of danger to self/others and no alternative equivalent. Details: _____             ========================    FOR EACH COLLATERAL    ========================    NAME: Aissatou Maravilla     NUMBER: [phone]     RELATIONSHIP: spouse     RELIABILITY: reliable historian    COMMENTS: [facility] spoke to collateral via [employer] ID #[id number], Kosovan speaking  [place]. Collateral denies concern for imminent risk. Denies acute safety concerns in the home. Collateral requested outpatient referrals for pt.     ========================    PATIENT DEMOGRAPHICS: 53yo male domiciled with wife, unemployed, hx anxiety, previously under oupatient tx at [facility].    ========================    HPI    BASELINE FUNCTIONING: collateral reports that pt has no hx of psychotic sx or aggressive/violent behavior. Collateral states that while pt has hx of anxiety, he denies depression or suicidal ideation.    DATE HPI STARTED: 2 weeks ago.    DECOMPENSATION: collateral reports increased anxiety and fear over the past 2 weeks. Collateral denies that it has hindered his ADLs. Collateral denies pt eliciting SI/HI/AVH. No hallucinations reported in relation to fear.     SUICIDALITY: denies.     VIOLENCE: denies.     SUBSTANCE: denies.     ========================    PAST PSYCHIATRIC HISTORY    ========================    DATE PAST PSYCHIATRIC HISTORY STARTED: 2 years ago, collateral states that pt previously saw a psychiatrist at [facility] outpatient clinic.     MAIN PSYCHIATRIC DIAGNOSIS: KOJO    PSYCHIATRIC HOSPITALIZATIONS: denies.     PRIOR ILLNESS: KOJO    SUICIDALITY: denies.     VIOLENCE: denies.     SUBSTANCE: denies.     ==============    OTHER HISTORY    ==============    CURRENT MEDICATION: Zoloft mg, Xanax prn     MEDICAL HISTORY: denies PMH.     ALLERGIES: no known allergies.     LEGAL ISSUES: denies.     FIREARM ACCESS: no reported access.      SOCIAL HISTORY: denies hx of trauma or abuse.     FAMILY HISTORY: collateral reports that pt's son also experiences anxiety and OCD sx.     DEVELOPMENTAL HISTORY: denies. Collateral (Aissatou Maravilla, spouse) has requested that the information provided remain confidential: Yes [  ] No [ x ]    Collateral (Aissatou Maravilla, spouse) has provided information that patient is/may be unaware of: Yes [  ] No [ x ]        Patient gives permission to obtain collateral from Aissatou Maravilla, spouse:     ( x ) Yes    (  )  No    Rationale for overriding objection    (  ) Lack of capacity. Details: ________    (  ) Assessing risk of danger to self/others. Details: ________          Rationale for selecting specific collateral source    ( x ) Potential to impact risk of danger to self/others and no alternative equivalent. Details: _____             ========================    FOR EACH COLLATERAL    ========================    NAME: Aissatou Maravilla     NUMBER: [phone]     RELATIONSHIP: spouse     RELIABILITY: reliable historian    COMMENTS: [facility] spoke to collateral via [employer] ID #[id number], Mexican speaking  [place]. Collateral denies concern for imminent risk. Denies acute safety concerns in the home. Collateral requested outpatient referrals for pt.     ========================    PATIENT DEMOGRAPHICS: 51yo male domiciled with wife, unemployed, hx generalized anxiety, no hx substance use, no hx SA, previously under oupatient tx at [facility].    ========================    HPI    BASELINE FUNCTIONING: collateral reports that pt has no hx of psychotic sx or aggressive/violent behavior. Collateral states that while pt has hx of anxiety, he denies depression or suicidal ideation.    DATE HPI STARTED: 2 weeks ago.    DECOMPENSATION: collateral reports increased anxiety and fear over the past 2 weeks. Collateral denies that it has hindered his ADLs. Collateral denies pt eliciting SI/HI/AVH. No hallucinations reported in relation to fear. Collateral denies specific stressors or events that may have triggered worsening anxiety sx.     SUICIDALITY: denies.     VIOLENCE: denies.     SUBSTANCE: denies.     ========================    PAST PSYCHIATRIC HISTORY    ========================    DATE PAST PSYCHIATRIC HISTORY STARTED: 2 years ago, collateral states that pt previously saw a psychiatrist at [facility] outpatient clinic.     MAIN PSYCHIATRIC DIAGNOSIS: KOJO    PSYCHIATRIC HOSPITALIZATIONS: denies.     PRIOR ILLNESS: KOJO    SUICIDALITY: denies.     VIOLENCE: denies.     SUBSTANCE: denies.     ==============    OTHER HISTORY    ==============    CURRENT MEDICATION: Zoloft mg, Xanax prn     MEDICAL HISTORY: denies PMH.     ALLERGIES: no known allergies.     LEGAL ISSUES: denies.     FIREARM ACCESS: no reported access.      SOCIAL HISTORY: denies hx of trauma or abuse.     FAMILY HISTORY: collateral reports that pt's son also experiences anxiety and OCD sx.     DEVELOPMENTAL HISTORY: denies.

## 2023-04-17 NOTE — ED PROVIDER NOTE - NSFOLLOWUPINSTRUCTIONS_ED_ALL_ED_FT
Anxiety    WHAT YOU NEED TO KNOW:    Anxiety is a condition that causes you to feel extremely worried or nervous. The feelings are so strong that they can cause problems with your daily activities or sleep. Anxiety may be triggered by something you fear, or it may happen without a cause. Family or work stress, smoking, caffeine, and alcohol can increase your risk for anxiety. Certain medicines or health conditions can also increase your risk. Anxiety can become a long-term condition if it is not managed or treated.     DISCHARGE INSTRUCTIONS:    Call 911 if:     You have chest pain, tightness, or heaviness that may spread to your shoulders, arms, jaw, neck, or back.      You feel like hurting yourself or someone else.     Contact your healthcare provider if:     Your symptoms get worse or do not get better with treatment.      Your anxiety keeps you from doing your regular daily activities.      You have new symptoms since your last visit.      You have questions or concerns about your condition or care.    Medicines:     Medicines may be given to help you feel more calm and relaxed, and decrease your symptoms.      Take your medicine as directed. Contact your healthcare provider if you think your medicine is not helping or if you have side effects. Tell him of her if you are allergic to any medicine. Keep a list of the medicines, vitamins, and herbs you take. Include the amounts, and when and why you take them. Bring the list or the pill bottles to follow-up visits. Carry your medicine list with you in case of an emergency.    Follow up with your healthcare provider within 2 weeks or as directed: Write down your questions so you remember to ask them during your visits.    Manage anxiety:     Talk to someone about your anxiety. Your healthcare provider may suggest counseling. Cognitive behavioral therapy can help you understand and change how you react to events that trigger your symptoms. You might feel more comfortable talking with a friend or family member about your anxiety. Choose someone you know will be supportive and encouraging.      Find ways to relax. Activities such as exercise, meditation, or listening to music can help you relax. Spend time with friends, or do things you enjoy.      Practice deep breathing. Deep breathing can help you relax when you feel anxious. Focus on taking slow, deep breaths several times a day, or during an anxiety attack. Breathe in through your nose and out through your mouth.       Create a regular sleep routine. Regular sleep can help you feel calmer during the day. Go to sleep and wake up at the same times every day. Do not watch television or use the computer right before bed. Your room should be comfortable, dark, and quiet.       Eat a variety of healthy foods. Healthy foods include fruits, vegetables, low-fat dairy products, lean meats, fish, whole-grain breads, and cooked beans. Healthy foods can help you feel less anxious and have more energy.      Exercise regularly. Exercise can increase your energy level. Exercise may also lift your mood and help you sleep better. Your healthcare provider can help you create an exercise plan.      Do not smoke. Nicotine and other chemicals in cigarettes and cigars can increase anxiety. Ask your healthcare provider for information if you currently smoke and need help to quit. E-cigarettes or smokeless tobacco still contain nicotine. Talk to your healthcare provider before you use these products.       Do not have caffeine. Caffeine can make your symptoms worse. Do not have foods or drinks that are meant to increase your energy level.      Limit or do not drink alcohol. Ask your healthcare provider if alcohol is safe for you. You may not be able to drink alcohol if you take certain anxiety or depression medicines. Limit alcohol to 1 drink per day if you are a woman. Limit alcohol to 2 drinks per day if you are a man. A drink of alcohol is 12 ounces of beer, 5 ounces of wine, or 1½ ounces of liquor.       Do not use drugs. Drugs can make your anxiety worse. It can also make anxiety hard to manage. Talk to your healthcare provider if you use drugs and want help to quit.          © Copyright [employer] 2019 All illustrations and images included in CareNotes are the copyrighted property of [employer].A[employer] Inc[employer] or [employer].

## 2023-04-17 NOTE — ED BEHAVIORAL HEALTH ASSESSMENT NOTE - SUMMARY
Patient is a 53yo  male, , domiciled with wife and sons, employed at a , with pph of KOJO, OCD, at least one prior short psych admission, no current outpt care, no past suicide attempts, denies substance use, and no reported pmh. He brings self in for worsening anxiety for the last 3 weeks.     Patient with long history of anxiety presents for worsening anxiety and panic attacks now significantly impacting his daily functioning for the past three weeks. He presents extremely anxious and tearful, concerned about multiple issues including medications, serious illness, risks and abuse potential, finding immediate outpatient treatment, and intolerable symptoms. Patient requesting medication adjustments that his PMD managing his meds unwilling to do, and highly motivated to follow up with outpatient. He denies any current suicidality or exhibit signs or symptoms of hailey or psychosis. There is no indication at this time for inpatient hospitalization, however due to patient's dysfunction due to anxiety, may benefit from short term supply of anxiolytic and medication adjustments as a bridge until he is able to get into outpatient. Pt given resources for outpt mental health services and [facility] OPD, referral note sent to clinic.

## 2023-04-17 NOTE — ED BEHAVIORAL HEALTH ASSESSMENT NOTE - RISK ASSESSMENT
rf - severe anxiety, panic attacks, decline in functioning, no outpatient  pf - help seeking, future oriented, motivated for outpatient, identifies reasons for living, no suicidality, no prior attempts, compliant to meds,

## 2023-04-17 NOTE — ED BEHAVIORAL HEALTH ASSESSMENT NOTE - SAFETY PLAN ADDT'L DETAILS
Education provided regarding environmental safety / lethal means restriction/Provision of National Suicide Prevention Lifeline 0-065-863-TALK (8614)

## 2023-04-17 NOTE — ED PROVIDER NOTE - OBJECTIVE STATEMENT
52-year-old male with past medical history of anxiety on sertraline presents to the ED complaining of worsening anxiety over the last 2 weeks.  Patient went to his PMD, was prescribed alprazolam but patient stopped taking it after he looked up information which said this medication is addicting.  Patient followed up and was prescribed Atarax but after taking it did not feel well.  Patient presents today feeling overwhelmed by his anxiety and requesting psychiatric eval.  He used to follow-up with psychiatrist regularly but was lost to follow-up after he moved.  He denies any alcohol or illicit drug use.  He denies other complaints. Pt denies fever, chills, nausea, vomiting, abdominal pain, diarrhea, headache, dizziness, weakness, chest pain, SOB, back pain, LOC, trauma, urinary symptoms, cough, SI/HI/hallucinations.

## 2023-04-17 NOTE — ED PROVIDER NOTE - PROGRESS NOTE DETAILS
Pt evaluated by telepsych, recommend discharge for outpt follow-up with increase of sertraline to 100mg daily and new prescription for 1mg Ativan BID as needed. Pt instructed need to follow-up as outpt for further management. Return precautions provided. Pt agreeable to dc.

## 2023-04-17 NOTE — ED BEHAVIORAL HEALTH ASSESSMENT NOTE - DETAILS
OCD in son spoke to ED team patient advised to return to ED or call 911 if symptoms worsen or thoughts to harm self or others occur. 2 teen sons hpi

## 2023-04-19 DIAGNOSIS — R74.8 ABNORMAL LEVELS OF OTHER SERUM ENZYMES: ICD-10-CM

## 2023-04-19 DIAGNOSIS — R37 SEXUAL DYSFUNCTION, UNSPECIFIED: ICD-10-CM

## 2023-04-19 DIAGNOSIS — F41.9 ANXIETY DISORDER, UNSPECIFIED: ICD-10-CM

## 2023-04-21 ENCOUNTER — APPOINTMENT (OUTPATIENT)
Dept: PSYCHIATRY | Facility: CLINIC | Age: 53
End: 2023-04-21

## 2023-04-21 ENCOUNTER — OUTPATIENT (OUTPATIENT)
Dept: OUTPATIENT SERVICES | Facility: HOSPITAL | Age: 53
LOS: 1 days | End: 2023-04-21
Payer: COMMERCIAL

## 2023-04-21 DIAGNOSIS — F41.9 ANXIETY DISORDER, UNSPECIFIED: ICD-10-CM

## 2023-04-21 PROCEDURE — 90791 PSYCH DIAGNOSTIC EVALUATION: CPT

## 2023-04-21 NOTE — RISK ASSESSMENT
[Clinical Interview] : Clinical Interview [Yes] : 1. Passive Ideation: Have you wished you were dead or wished you could go to sleep and not wake up? Yes [Depressed mood/Anhedonia] : depressed mood/anhedonia [Severe anxiety, agitation or panic] : severe anxiety, agitation or panic [None known] : None known [Identifies reasons for living] : identifies reasons for living [Supportive social network of family or friends] : supportive social network of family or friends [Responsibility to children, family, or others] : responsibility to children, family, or others [Fear of death/actual act of killing self] : fear of death or the actual act of killing self [Engaged in work or school] : engaged in work or school [None in the patient's lifetime] : None in the patient's lifetime [No known risk factors] : No known risk factors [Residential stability] : residential stability [Relationship stability] : relationship stability [Employment stability] : employment stability [Affective stability] : affective stability [No] : no

## 2023-04-21 NOTE — HEALTH RISK ASSESSMENT
[Patient/Caregiver not ready to engage] : , patient/caregiver not ready to engage [AdvancecareDate] : 4/21/23 [Patient/Collateral not ready to engage] : , patient/collateral not ready to engage [] : 4/21/23

## 2023-04-21 NOTE — SOCIAL HISTORY
[FreeTextEntry1] : Employment history: Employed at [employer] .\par Developmental history: Denies.\par Sexual hx/identity Sexual History/ Concern (include sexual orientation and other relevant information) : Heterosexual.\par Race - ethnicity - culture information: Palestinian.\par Social supports (friends, Volunteers, club, AA meeting, other meetings ) ? Friends.\par Meaningful Activities: Walking, Jogging, \par \par \par Spiritual Assessment Tool - FICA\par F. What is your odin or belief? Sabianist, but doesn’t practice\par Do you consider yourself spiritual or Advent? none\par Is there something you believe in that gives meaning to your life? N/A\par I: Is it important in your life? N/A\par What influence does it have on how you take care of yourself? N/A\par How have your beliefs influenced your behavior during this illness? What role do your beliefs play in regaining your health? n/A\par C. Are you part of a spiritual or Advent community? no\par Is this of support to you and how? N/A\par Is there a person or group of people you really love or who are really important to you? "my wife, my kids"\par H. We have been discussing your belief and supports. What else gives you internal support? "Hope that i will get better"\par What are your sources of hope, strength, comfort and peace? "my family"\par What do you hold on to during difficult times? "my family"\par what sustains you and keeps you going?" my family"\par A. How would you like me, your healthcare provider, to address these issues in your healthcare? " to help me manage my anxiety"\par \par  \par \par \par \par  \par \par \par
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
## 2023-04-21 NOTE — HISTORY OF PRESENT ILLNESS
[Not Applicable] : Not applicable [FreeTextEntry1] :  Intake date: 4/21/23  \par Time of intake: 2PM \par End of Intake: 3:00PM \par \par Patient signed all consents including HIPPA permission to talk to wife if needed. Patient is slding scale, HH referral declined. Patient speaks Argentine and little english. Patient is okay with using translation services. \par \par ED Tele psych referral on 4/17/23. Patient went to ED for severe anxiety, OCD and depression. Patient is sliding scale. The patient's son attends [facility] clinic [facility] and sees Dr Osullivan and Padma.   \par \par Hao Maravilla 51 y/o M,  since 1984, 3 adult kids, twin boys 20 y/o M, and a daughter 26 y/o F. Domiciled in a rental apartment with wife and the twin boys. Born [place] raised in [place]. Employed at a 's place. Denies substance use. Reports that over 20 years ago one had one IPP for 1 night due to anxiety and depression. Patient reports that 20 years ago he saw a psychiatrist and therapist and was diagnosed with Anxiety and depression and OCD, stopped 3 years ago when the psychiatrist left from [facility] and commute was unbearable. For the last 3 years Dr Leone prescribed the Zoloft 75 mg. The patient has not received mental health in 3 years and is ready to re-engage. Three weeks ago, the patient started exhibiting severe anxiety and went to ED on 4/16/23. At ED the patient prescribed Sertraline 100 mg 14 day supply and Ativan 1 mg. He reports anxiety symptoms such as waking up in panic, overthinking and having panic attacks. The patient reports depressive symptoms such as depressed mood, anhedonia, intrusive thoughts and crying. His OCD fears are around addiction, and he won't take anything that may cause addiction even prescribed medications, he also gets extremely worried about his health and his anxiety getting worst, “sometimes I feel like I may have tumor” fear about his health in general.  Trauma from father he was an alcoholic, never physical abuse but screaming and loud. Denies S/H/I or self-harming behaviors but reports that when he gets very anxious or feels upset, he has negative thoughts about dying but reports he would never harm himself or others.  PCP Mayur Byrnes.    [FreeTextEntry2] : 1 IPP 20 years ago for Anxiety. [FreeTextEntry3] : 100 mg Sertraline\par 1 mg Ativan.

## 2023-04-21 NOTE — FAMILY HISTORY
[FreeTextEntry1] : Family composition:Hao Maravilla 51 y/o M,  since 1982, 3 adult kids, twin boys 18 y/o M, and a daughter 26 y/o F. Domiciled in a rental apartment with wife and the twin boys. \par Family history and background: Born and raised in [place] and [place]. Employed at a 's place.\par Family relationship:Great.\par Pertinent Family Medical, MH and Substance Use History including Adult Child of Alcoholic and child of substance abuse status; history of cancer and heart disease:\par \par Father: Alcoholic\par Son: Depression\par

## 2023-04-21 NOTE — PSYCHOSOCIAL ASSESSMENT
[None known] : None known [HS Diploma or GED] : HS Diploma or GED [Yes (select details below)] : Have you ever experienced this type of event? Yes [Competitive and integrated employment] : Competitive and integrated employment [35 hours or more] : 35 hours or more [None] : none [Private residence (home, apartment, rooming house, hotel, motel, supported housing, supported Single Room Occupancy (SRO),] : Private residence (home, apartment, rooming house, hotel, motel, supported housing, supported Single Room Occupancy (SRO), permanent housing programs, transient housing programs, and shelter plus care housing) [Client's spouse or domestic partner] : client's spouse or domestic partner [No] : Patient has personal representation (legal guardian, representative payee, conservatorship)? No [Earned income] : earned income [FreeTextEntry4] : Client lives with his wife and twin boys [FreeTextEntry7] : Aissatou teresa wife

## 2023-04-21 NOTE — DISCUSSION/SUMMARY
[Low acute suicide risk] : Low acute suicide risk [No] : No [Not clinically indicated] : Safety Plan completed/updated (for individuals at risk): Not clinically indicated [FreeTextEntry1] : Assessment Summary: \par  Intake date: 4/21/23  \par Time of intake: 2PM \par End of Intake: 3:00PM \par \par Patient signed all consents including HIPPA permission to talk to wife if needed. Patient is slding scale, HH referral declined. Patient speaks Namibian and little english. Patient is okay with using translation services. \par \par ED Tele psych referral on 4/17/23. Patient went to ED for severe anxiety, OCD and depression. Patient is sliding scale. The patient's son attends [facility]PD clinic [facility] and sees Dr Osullivan and Padma.   \par \par Hao Maravilla 51 y/o M,  since 1982, 3 adult kids, twin boys 20 y/o M, and a daughter 26 y/o F. Domiciled in a rental apartment with wife and the twin boys. Born [place] raised in [place]. Employed at a 's place. Denies substance use. Reports that over 20 years ago one had one IPP for 1 night due to anxiety and depression. Patient reports that 20 years ago he saw a psychiatrist and therapist and was diagnosed with Anxiety and depression and OCD, stopped 3 years ago when the psychiatrist left from [facility] and commute was unbearable. For the last 3 years Dr Leone prescribed the Zoloft 75 mg. The patient has not received mental health in 3 years and is ready to re-engage. Three weeks ago, the patient started exhibiting severe anxiety and went to ED on 4/16/23. At ED the patient prescribed Sertraline 100 mg 14 day supply and Ativan 1 mg. He reports anxiety symptoms such as waking up in panic, overthinking and having panic attacks. The patient reports depressive symptoms such as depressed mood, anhedonia, intrusive thoughts and crying. His OCD fears are around addiction, and he won't take anything that may cause addiction even prescribed medications, he also gets extremely worried about his health and his anxiety getting worst, “sometimes I feel like I may have tumor” fear about his health in general.  Trauma from father he was an alcoholic, never physical abuse but screaming and loud. Denies S/H/I or self-harming behaviors but reports that when he gets very anxious or feels upset, he has negative thoughts about dying but reports he would never harm himself or others.  PCP Mayur Byrnes.      \par  \par Assessed Needs- Functional Domain: Anxiety/Depression, OCD  \par  \par Prioritized Assessed Needs: Anxiety/Depression, OCD   \par  \par Life goals, strengths, barriers, past successes: "to be sane, to get better, lessen my anxiety".    \par  \par Recommendation:\par  \par [ ]      Medication Only\par [ ]     Individual therapy only\par [x ]     Medication and Individual therapy\par [ ]     Group therapy\par \par

## 2023-04-21 NOTE — REASON FOR VISIT
[Number can be texted] : number can be texted [OK  to leave message] : OK  to leave message [Other:___] : [unfilled] [facility] [FreeTextEntry2] : [phone] [FreeTextEntry5] : Citizen of [place] [FreeTextEntry6] : Hao [FreeTextEntry7] : He/His [FreeTextEntry1] : Anxiety/Depression/OCD

## 2023-04-22 DIAGNOSIS — F41.9 ANXIETY DISORDER, UNSPECIFIED: ICD-10-CM

## 2023-05-01 ENCOUNTER — OUTPATIENT (OUTPATIENT)
Dept: OUTPATIENT SERVICES | Facility: HOSPITAL | Age: 53
LOS: 1 days | End: 2023-05-01
Payer: COMMERCIAL

## 2023-05-01 ENCOUNTER — APPOINTMENT (OUTPATIENT)
Dept: PSYCHIATRY | Facility: CLINIC | Age: 53
End: 2023-05-01
Payer: COMMERCIAL

## 2023-05-01 ENCOUNTER — APPOINTMENT (OUTPATIENT)
Dept: PSYCHIATRY | Facility: CLINIC | Age: 53
End: 2023-05-01

## 2023-05-01 ENCOUNTER — NON-APPOINTMENT (OUTPATIENT)
Age: 53
End: 2023-05-01

## 2023-05-01 VITALS
SYSTOLIC BLOOD PRESSURE: 118 MMHG | TEMPERATURE: 98.2 F | HEIGHT: 67 IN | BODY MASS INDEX: 28.25 KG/M2 | DIASTOLIC BLOOD PRESSURE: 74 MMHG | WEIGHT: 180 LBS | HEART RATE: 68 BPM | OXYGEN SATURATION: 98 % | RESPIRATION RATE: 20 BRPM

## 2023-05-01 DIAGNOSIS — F41.9 ANXIETY DISORDER, UNSPECIFIED: ICD-10-CM

## 2023-05-01 PROCEDURE — 90792 PSYCH DIAG EVAL W/MED SRVCS: CPT | Mod: 25,59

## 2023-05-01 PROCEDURE — 99401 PREV MED CNSL INDIV APPRX 15: CPT

## 2023-05-01 PROCEDURE — 90792 PSYCH DIAG EVAL W/MED SRVCS: CPT

## 2023-05-01 RX ORDER — HYDROXYZINE HYDROCHLORIDE 50 MG/1
50 TABLET ORAL
Qty: 30 | Refills: 2 | Status: DISCONTINUED | COMMUNITY
Start: 2023-04-07 | End: 2023-05-01

## 2023-05-01 NOTE — ADDENDUM
[FreeTextEntry1] : Pt arrived to clinic for psychiatric evaluation, pt was assessed, pt is axo4, presenting well and states his mood is "okay" today. Active problems and medical history reviewed with pt, pt denies any active concerns at this time, vital signs are stable.

## 2023-05-01 NOTE — DISCUSSION/SUMMARY
[Denied] : Denied [No] : No [Advised to schedule] : Advised to schedule [Not indicated at this time] : Not indicated at this time [Yes] : Yes [Does patient use tobacco products?] : Patient does not use tobacco products [Does patient use medical marijuana?] : Patient does not use medical marijuana. [Patient has been tested for HIV?] : Patient has not been tested for HIV [Patient has been tested for Hepatitis?] : Patient has not been tested for hepatitis [Patient would like to be tested/re-tested?] : patient would not like to be tested/re-tested [Current or past COVID-19 diagnosis?] : Patient had a present or past COVID-19 diagnosis [Vaccinated?] : is vaccinated [Education provided about COVID-19?] : Education provided about COVID-19

## 2023-05-02 DIAGNOSIS — F41.9 ANXIETY DISORDER, UNSPECIFIED: ICD-10-CM

## 2023-05-11 NOTE — PSYCHOSOCIAL ASSESSMENT
[None known] : None known [HS Diploma or GED] : HS Diploma or GED [Yes (select details below)] : Have you ever experienced this type of event? Yes [Competitive and integrated employment] : Competitive and integrated employment [35 hours or more] : 35 hours or more [Earned income] : earned income [None] : none [Private residence (home, apartment, rooming house, hotel, motel, supported housing, supported Single Room Occupancy (SRO),] : Private residence (home, apartment, rooming house, hotel, motel, supported housing, supported Single Room Occupancy (SRO), permanent housing programs, transient housing programs, and shelter plus care housing) [Client's spouse or domestic partner] : client's spouse or domestic partner [No] : Patient has personal representation (legal guardian, representative payee, conservatorship)? No [FreeTextEntry4] : Client lives with his wife and twin boys [FreeTextEntry7] : Aissatou teresa wife

## 2023-05-11 NOTE — PLAN
[Admit to Program     (Add Program Admission information to a new column in the Admit/Discharge Flowsheet)] : Admit to program [FreeTextEntry4] : Mood is stable\par \par anxiety increased\par \par health is good\par \par goal to reduce anxiety/panic symptoms

## 2023-05-11 NOTE — DISCUSSION/SUMMARY
[Low acute suicide risk] : Low acute suicide risk [No] : No [Not clinically indicated] : Safety Plan completed/updated (for individuals at risk): Not clinically indicated [FreeTextEntry1] : Assessment Summary: \par  Intake date: 4/21/23  \par Time of intake: 2PM \par End of Intake: 3:00PM \par \par Patient signed all consents including HIPPA permission to talk to wife if needed. Patient is slding scale, HH referral declined. Patient speaks Serbian and little english. Patient is okay with using translation services. \par \par ED Tele psych referral on 4/17/23. Patient went to ED for severe anxiety, OCD and depression. Patient is sliding scale. The patient's son attends O[facility] and sees Dr Osullivan and Padma.   \par \par Hao Maravilla 51 y/o M,  since 1984, 3 adult kids, twin boys 20 y/o M, and a daughter 28 y/o F. Domiciled in a rental apartment with wife and the twin boys. Born [place] raised in [place]. Employed at a 's place. Denies substance use. Reports that over 20 years ago one had one IPP for 1 night due to anxiety and depression. Patient reports that 20 years ago he saw a psychiatrist and therapist and was diagnosed with Anxiety and depression and OCD, stopped 3 years ago when the psychiatrist left from [facility] and commute was unbearable. For the last 3 years Dr Leone prescribed the Zoloft 75 mg. The patient has not received mental health in 3 years and is ready to re-engage. Three weeks ago, the patient started exhibiting severe anxiety and went to ED on 4/16/23. At ED the patient prescribed Sertraline 100 mg 14 day supply and Ativan 1 mg. He reports anxiety symptoms such as waking up in panic, overthinking and having panic attacks. The patient reports depressive symptoms such as depressed mood, anhedonia, intrusive thoughts and crying. His OCD fears are around addiction, and he won't take anything that may cause addiction even prescribed medications, he also gets extremely worried about his health and his anxiety getting worst, “sometimes I feel like I may have tumor” fear about his health in general.  Trauma from father he was an alcoholic, never physical abuse but screaming and loud. Denies S/H/I or self-harming behaviors but reports that when he gets very anxious or feels upset, he has negative thoughts about dying but reports he would never harm himself or others.  PCP Mayur Byrnes.      \par  \par Assessed Needs- Functional Domain: Anxiety/Depression, OCD  \par  \par Prioritized Assessed Needs: Anxiety/Depression, OCD   \par  \par Life goals, strengths, barriers, past successes: "to be sane, to get better, lessen my anxiety".    \par  \par Recommendation:\par  \par [ x]      Medication Only\par [ ]     Individual therapy only\par [ ]     Medication and Individual therapy\par [ ]     Group therapy\par \par \par Medication management:\par 1. Raise sertraline 100mg po qam to 125mg po qam for 2 weeks with option to increase to 150mg po qam\par 2. Continue lorazepam 0.5mg po BID prn anxiety\par \par Follow up in 4 weeks\par

## 2023-05-11 NOTE — FAMILY HISTORY
[FreeTextEntry1] : Family composition:Hao Maravilla 51 y/o M,  since 1982, 3 adult kids, twin boys 20 y/o M, and a daughter 28 y/o F. Domiciled in a rental apartment with wife and the twin boys. \par Family history and background: Born and raised in [place]. Employed at a 's place.\par Family relationship:Great.\par Pertinent Family Medical, MH and Substance Use History including Adult Child of Alcoholic and child of substance abuse status; history of cancer and heart disease:\par \par Father: Alcoholic\par Son: Depression\par

## 2023-05-11 NOTE — PAST MEDICAL HISTORY
[FreeTextEntry1] :  Intake date: 4/21/23  \par Time of intake: 2PM \par End of Intake: 3:00PM \par \par Patient signed all consents including HIPPA permission to talk to wife if needed. Patient is slding scale, HH referral declined. Patient speaks Irish and little english. Patient is okay with using translation services. \par \par ED Tele psych referral on 4/17/23. Patient went to ED for severe anxiety, OCD and depression. Patient is sliding scale. The patient's son attends OPD clinic [facility] and sees Dr Osullivan and Padma.   \par \par Hao Maravilla 51 y/o M,  since 1984, 3 adult kids, twin boys 20 y/o M, and a daughter 28 y/o F. Domiciled in a rental apartment with wife and the twin boys. Born [place] raised in [place]. Employed at a 's place. Denies substance use. Reports that over 20 years ago one had one IPP for 1 night due to anxiety and depression. Patient reports that 20 years ago he saw a psychiatrist and therapist and was diagnosed with Anxiety and depression and OCD, stopped 3 years ago when the psychiatrist left from [facility] and commute was unbearable. For the last 3 years Dr Leone prescribed the Zoloft 75 mg. The patient has not received mental health in 3 years and is ready to re-engage. Three weeks ago, the patient started exhibiting severe anxiety and went to ED on 4/16/23. At ED the patient prescribed Sertraline 100 mg 14 day supply and Ativan 1 mg. He reports anxiety symptoms such as waking up in panic, overthinking and having panic attacks. The patient reports depressive symptoms such as depressed mood, anhedonia, intrusive thoughts and crying. His OCD fears are around addiction, and he won't take anything that may cause addiction even prescribed medications, he also gets extremely worried about his health and his anxiety getting worst, “sometimes I feel like I may have tumor” fear about his health in general.  Trauma from father he was an alcoholic, never physical abuse but screaming and loud. Denies S/H/I or self-harming behaviors but reports that when he gets very anxious or feels upset, he has negative thoughts about dying but reports he would never harm himself or others.  PCP Mayur Byrnes.

## 2023-05-11 NOTE — SOCIAL HISTORY
[FreeTextEntry1] : Employment history: Employed at [employer] .\par Developmental history: Denies.\par Sexual hx/identity Sexual History/ Concern (include sexual orientation and other relevant information) : Heterosexual.\par Race - ethnicity - culture information: Libyan.\par Social supports (friends, Volunteers, club, AA meeting, other meetings ) ? Friends.\par Meaningful Activities: Walking, Jogging, \par \par \par Spiritual Assessment Tool - FICA\par F. What is your odin or belief? Temple, but doesn’t practice\par Do you consider yourself spiritual or Rastafari? none\par Is there something you believe in that gives meaning to your life? N/A\par I: Is it important in your life? N/A\par What influence does it have on how you take care of yourself? N/A\par How have your beliefs influenced your behavior during this illness? What role do your beliefs play in regaining your health? n/A\par C. Are you part of a spiritual or Rastafari community? no\par Is this of support to you and how? N/A\par Is there a person or group of people you really love or who are really important to you? "my wife, my kids"\par H. We have been discussing your belief and supports. What else gives you internal support? "Hope that i will get better"\par What are your sources of hope, strength, comfort and peace? "my family"\par What do you hold on to during difficult times? "my family"\par what sustains you and keeps you going?" my family"\par A. How would you like me, your healthcare provider, to address these issues in your healthcare? " to help me manage my anxiety"\par \par  \par \par \par \par  \par \par \par

## 2023-05-11 NOTE — PHYSICAL EXAM
[None] : none [Cooperative] : cooperative [Anxious] : anxious [Clear] : clear [Linear/Goal Directed] : linear/goal directed [Average] : average [WNL] : within normal limits [de-identified] : anxious

## 2023-05-11 NOTE — HISTORY OF PRESENT ILLNESS
[Not Applicable] : Not applicable [FreeTextEntry1] : Per intake, "\par Patient signed all consents including HIPPA permission to talk to wife if needed. Patient is slding scale, HH referral declined. Patient speaks Maltese and little english. Patient is okay with using translation services. \[facility] on 4[facility]17/23. Patient went to ED for severe anxiety, OCD and depression. Patient is sliding scale. The patient's son attends [facility] clinic [facility] and sees Dr Osullivan and Padma.   \par \par Hao Maravilla 53 y/o M,  since 1984, 3 adult kids, twin boys 20 y/o M, and a daughter 28 y/o F. Domiciled in a rental apartment with wife and the twin boys. Born [place] raised in [place]. Employed at a 's place. Denies substance use. Reports that over 20 years ago one had one IPP for 1 night due to anxiety and depression. Patient reports that 20 years ago he saw a psychiatrist and therapist and was diagnosed with Anxiety and depression and OCD, stopped 3 years ago when the psychiatrist left from [facility] and commute was unbearable. For the last 3 years Dr Leone prescribed the Zoloft 75 mg. The patient has not received mental health in 3 years and is ready to re-engage. Three weeks ago, the patient started exhibiting severe anxiety and went to ED on 4/16/23. At ED the patient prescribed Sertraline 100 mg 14 day supply and Ativan 1 mg. He reports anxiety symptoms such as waking up in panic, overthinking and having panic attacks. The patient reports depressive symptoms such as depressed mood, anhedonia, intrusive thoughts and crying. His OCD fears are around addiction, and he won't take anything that may cause addiction even prescribed medications, he also gets extremely worried about his health and his anxiety getting worst, “sometimes I feel like I may have tumor” fear about his health in general.  Trauma from father he was an alcoholic, never physical abuse but screaming and loud. Denies S/H/I or self-harming behaviors but reports that when he gets very anxious or feels upset, he has negative thoughts about dying but reports he would never harm himself or others.  PCP Mayur Byrnes.   "\par \par Per  #[id number]\par This is a 52 year old patient who presents for medication management.  The patient reports fairly stable mood, good, sleep and appetite.  The patient denies any symptoms of depression, hailey, or psychosis at this time.  The patient has increased anxiety that impairs their daily functioning. The patient denies any suicidal ideation, homicidal ideation, no auditory or visual hallucinations, or paranoid ideation.  The patient has no medical complaints. The patient denies any drug or alcohol use, and is not smoking at this time. I requested the patient's updated physical and labs from their PCP.  Coping skills were discussed and a safety plan was provided.  The patient was educated on the risks, benefits, and alternatives of their psychiatric medications.  The patient will not engage in psychotherapy at this time.  The patient consents to ongoing medication management.\par  [FreeTextEntry2] : 1 IPP 20 years ago for Anxiety. [FreeTextEntry3] : 100 mg Sertraline\par 1 mg Ativan.

## 2023-05-30 ENCOUNTER — APPOINTMENT (OUTPATIENT)
Dept: PSYCHIATRY | Facility: CLINIC | Age: 53
End: 2023-05-30
Payer: COMMERCIAL

## 2023-05-30 ENCOUNTER — APPOINTMENT (OUTPATIENT)
Dept: NEUROLOGY | Facility: CLINIC | Age: 53
End: 2023-05-30

## 2023-05-30 ENCOUNTER — OUTPATIENT (OUTPATIENT)
Dept: OUTPATIENT SERVICES | Facility: HOSPITAL | Age: 53
LOS: 1 days | End: 2023-05-30
Payer: COMMERCIAL

## 2023-05-30 DIAGNOSIS — F41.0 PANIC DISORDER [EPISODIC PAROXYSMAL ANXIETY]: ICD-10-CM

## 2023-05-30 DIAGNOSIS — F33.1 MAJOR DEPRESSIVE DISORDER, RECURRENT, MODERATE: ICD-10-CM

## 2023-05-30 DIAGNOSIS — F41.1 GENERALIZED ANXIETY DISORDER: ICD-10-CM

## 2023-05-30 PROCEDURE — 99214 OFFICE O/P EST MOD 30 MIN: CPT | Mod: 95

## 2023-05-30 PROCEDURE — 99214 OFFICE O/P EST MOD 30 MIN: CPT

## 2023-05-30 NOTE — REASON FOR VISIT
[Number can be texted] : number can be texted [OK  to leave message] : OK  to leave message [FreeTextEntry5] : [place]er [FreeTextEntry6] : Hao [FreeTextEntry7] : He/His [Other:___] : [unfilled] [facility] [FreeTextEntry2] : "I am better now, some mild side effects with increase in zoloft to 150mg." [FreeTextEntry1] : Anxiety/Depression/OCD

## 2023-05-30 NOTE — SOCIAL HISTORY
[FreeTextEntry1] : Employment history: Employed at [employer] .\par Developmental history: Denies.\par Sexual hx/identity Sexual History/ Concern (include sexual orientation and other relevant information) : Heterosexual.\par Race - ethnicity - culture information: Cambodian.\par Social supports (friends, Volunteers, club, AA meeting, other meetings ) ? Friends.\par Meaningful Activities: Walking, Jogging, \par \par \par Spiritual Assessment Tool - FICA\par F. What is your odin or belief? Christianity, but doesn’t practice\par Do you consider yourself spiritual or Orthodox? none\par Is there something you believe in that gives meaning to your life? N/A\par I: Is it important in your life? N/A\par What influence does it have on how you take care of yourself? N/A\par How have your beliefs influenced your behavior during this illness? What role do your beliefs play in regaining your health? n/A\par C. Are you part of a spiritual or Orthodox community? no\par Is this of support to you and how? N/A\par Is there a person or group of people you really love or who are really important to you? "my wife, my kids"\par H. We have been discussing your belief and supports. What else gives you internal support? "Hope that i will get better"\par What are your sources of hope, strength, comfort and peace? "my family"\par What do you hold on to during difficult times? "my family"\par what sustains you and keeps you going?" my family"\par A. How would you like me, your healthcare provider, to address these issues in your healthcare? " to help me manage my anxiety"\par \par  \par \par \par \par  \par \par \par

## 2023-05-30 NOTE — PAST MEDICAL HISTORY
[FreeTextEntry1] :  Intake date: 4/21/23  \par Time of intake: 2PM \par End of Intake: 3:00PM \par \par Patient signed all consents including HIPPA permission to talk to wife if needed. Patient is slding scale, HH referral declined. Patient speaks Burkinan and little english. Patient is okay with using translation services. \par \par ED Tele psych referral on 4/17/23. Patient went to ED for severe anxiety, OCD and depression. Patient is sliding scale. The patient's son attends OPD clinic [facility] and sees Dr Osullivan and Padma.   \par \par Hao Maravilla 51 y/o M,  since 1982, 3 adult kids, twin boys 18 y/o M, and a daughter 26 y/o F. Domiciled in a rental apartment with wife and the twin boys. Born [place] raised in [place]. Employed at a 's [employer]. Denies substance use. Reports that over 20 years ago one had one IPP for 1 night due to anxiety and depression. Patient reports that 20 years ago he saw a psychiatrist and therapist and was diagnosed with Anxiety and depression and OCD, stopped 3 years ago when the psychiatrist left from [facility] and commute was unbearable. For the last 3 years Dr Leone prescribed the Zoloft 75 mg. The patient has not received mental health in 3 years and is ready to re-engage. Three weeks ago, the patient started exhibiting severe anxiety and went to ED on 4/16/23. At ED the patient prescribed Sertraline 100 mg 14 day supply and Ativan 1 mg. He reports anxiety symptoms such as waking up in panic, overthinking and having panic attacks. The patient reports depressive symptoms such as depressed mood, anhedonia, intrusive thoughts and crying. His OCD fears are around addiction, and he won't take anything that may cause addiction even prescribed medications, he also gets extremely worried about his health and his anxiety getting worst, “sometimes I feel like I may have tumor” fear about his health in general.  Trauma from father he was an alcoholic, never physical abuse but screaming and loud. Denies S/H/I or self-harming behaviors but reports that when he gets very anxious or feels upset, he has negative thoughts about dying but reports he would never harm himself or others.  PCP Mayur Byrnes.

## 2023-05-30 NOTE — PAST MEDICAL HISTORY
[FreeTextEntry1] :  Intake date: 4/21/23  \par Time of intake: 2PM \par End of Intake: 3:00PM \par \par Patient signed all consents including HIPPA permission to talk to wife if needed. Patient is slding scale, HH referral declined. Patient speaks Indonesian and little english. Patient is okay with using translation services. \par \par ED Tele psych referral on 4/17/23. Patient went to ED for severe anxiety, OCD and depression. Patient is sliding scale. The patient's son attends OPD clinic [facility] and sees Dr Osullivan and Padma.   \par \par Hao Maravilla 53 y/o M,  since 1984, 3 adult kids, twin boys 20 y/o M, and a daughter 28 y/o F. Domiciled in a rental apartment with wife and the twin boys. Born [place] raised in [place]. Employed at a 's [employer]. Denies substance use. Reports that over 20 years ago one had one IPP for 1 night due to anxiety and depression. Patient reports that 20 years ago he saw a psychiatrist and therapist and was diagnosed with Anxiety and depression and OCD, stopped 3 years ago when the psychiatrist left from [facility] and commute was unbearable. For the last 3 years Dr Leone prescribed the Zoloft 75 mg. The patient has not received mental health in 3 years and is ready to re-engage. Three weeks ago, the patient started exhibiting severe anxiety and went to ED on 4/16/23. At ED the patient prescribed Sertraline 100 mg 14 day supply and Ativan 1 mg. He reports anxiety symptoms such as waking up in panic, overthinking and having panic attacks. The patient reports depressive symptoms such as depressed mood, anhedonia, intrusive thoughts and crying. His OCD fears are around addiction, and he won't take anything that may cause addiction even prescribed medications, he also gets extremely worried about his health and his anxiety getting worst, “sometimes I feel like I may have tumor” fear about his health in general.  Trauma from father he was an alcoholic, never physical abuse but screaming and loud. Denies S/H/I or self-harming behaviors but reports that when he gets very anxious or feels upset, he has negative thoughts about dying but reports he would never harm himself or others.  PCP Mayur Byrnes.

## 2023-05-30 NOTE — HISTORY OF PRESENT ILLNESS
[Not Applicable] : Not applicable [FreeTextEntry1] : Per intake, "\par Patient signed all consents including HIPPA permission to talk to wife if needed. Patient is slding scale, HH referral declined. Patient speaks Estonian and little english. Patient is okay with using translation services. \[facility] \[facility] ED Tele psych referral on 4/17/23. Patient went to ED for severe anxiety, OCD and depression. Patient is sliding scale. The patient's son attends OPD clinic [facility] and sees Dr Osullivan and Padma.   \par \par Hao Maravilla 53 y/o M,  since 1984, 3 adult kids, twin boys 18 y/o M, and a daughter 28 y/o F. Domiciled in a rental apartment with wife and the twin boys. Born [place] raised in [place] Employed at a 's place. Denies substance use. Reports that over 20 years ago one had one IPP for 1 night due to anxiety and depression. Patient reports that 20 years ago he saw a psychiatrist and therapist and was diagnosed with Anxiety and depression and OCD, stopped 3 years ago when the psychiatrist left from [facility] and commute was unbearable. For the last 3 years Dr Leone prescribed the Zoloft 75 mg. The patient has not received mental health in 3 years and is ready to re-engage. Three weeks ago, the patient started exhibiting severe anxiety and went to ED on 4/16/23. At ED the patient prescribed Sertraline 100 mg 14 day supply and Ativan 1 mg. He reports anxiety symptoms such as waking up in panic, overthinking and having panic attacks. The patient reports depressive symptoms such as depressed mood, anhedonia, intrusive thoughts and crying. His OCD fears are around addiction, and he won't take anything that may cause addiction even prescribed medications, he also gets extremely worried about his health and his anxiety getting worst, “sometimes I feel like I may have tumor” fear about his health in general.  Trauma from father he was an alcoholic, never physical abuse but screaming and loud. Denies S/H/I or self-harming behaviors but reports that when he gets very anxious or feels upset, he has negative thoughts about dying but reports he would never harm himself or others.  PCP Mayur Byrnes.   "\par \par Per  #[id number]\par This is a 52 year old patient who presents for medication management.  The patient reports fairly stable mood, good, sleep and appetite.  The patient denies any symptoms of depression, hailey, or psychosis at this time.  The patient has increased anxiety that impairs their daily functioning. The patient denies any suicidal ideation, homicidal ideation, no auditory or visual hallucinations, or paranoid ideation.  The patient has no medical complaints. The patient denies any drug or alcohol use, and is not smoking at this time. I requested the patient's updated physical and labs from their PCP.  Coping skills were discussed and a safety plan was provided.  The patient was educated on the risks, benefits, and alternatives of their psychiatric medications.  The patient will not engage in psychotherapy at this time.  The patient consents to ongoing medication management.\par  [FreeTextEntry2] : 1 IPP 20 years ago for Anxiety. [FreeTextEntry3] : 100 mg Sertraline\par 1 mg Ativan.

## 2023-05-30 NOTE — REASON FOR VISIT
[Number can be texted] : number can be texted [OK  to leave message] : OK  to leave message [FreeTextEntry5] : Israeli [FreeTextEntry6] : Hao [FreeTextEntry7] : He/His [Other:___] : [unfilled] [[facility] Provider/Facility] : [facility] Provider/Facility [FreeTextEntry2] : "I am better now, some mild side effects with increase in zoloft to 150mg." [FreeTextEntry1] : Anxiety/Depression/OCD

## 2023-05-30 NOTE — SOCIAL HISTORY
[FreeTextEntry1] : Employment history: Employed at [employer] .\par Developmental history: Denies.\par Sexual hx/identity Sexual History/ Concern (include sexual orientation and other relevant information) : Heterosexual.\par Race - ethnicity - culture information: Indonesian.\par Social supports (friends, Volunteers, club, AA meeting, other meetings ) ? Friends.\par Meaningful Activities: Walking, Jogging, \par \par \par Spiritual Assessment Tool - FICA\par F. What is your odin or belief? Jainism, but doesn’t practice\par Do you consider yourself spiritual or Confucianist? none\par Is there something you believe in that gives meaning to your life? N/A\par I: Is it important in your life? N/A\par What influence does it have on how you take care of yourself? N/A\par How have your beliefs influenced your behavior during this illness? What role do your beliefs play in regaining your health? n/A\par C. Are you part of a spiritual or Confucianist community? no\par Is this of support to you and how? N/A\par Is there a person or group of people you really love or who are really important to you? "my wife, my kids"\par H. We have been discussing your belief and supports. What else gives you internal support? "Hope that i will get better"\par What are your sources of hope, strength, comfort and peace? "my family"\par What do you hold on to during difficult times? "my family"\par what sustains you and keeps you going?" my family"\par A. How would you like me, your healthcare provider, to address these issues in your healthcare? " to help me manage my anxiety"\par \par  \par \par \par \par  \par \par \par

## 2023-05-30 NOTE — HISTORY OF PRESENT ILLNESS
[Not Applicable] : Not applicable [FreeTextEntry1] : Per intake, "\par Patient signed all consents including HIPPA permission to talk to wife if needed. Patient is slding scale, HH referral declined. Patient speaks Slovenian and little english. Patient is okay with using translation services. \[facility] \[facility] ED Tele psych referral on 4/17/23. Patient went to ED for severe anxiety, OCD and depression. Patient is sliding scale. The patient's son attends OPD clinic [facility] and sees Dr Osullivan and Padma.   \par \par Hao Maravilla 53 y/o M,  since 1984, 3 adult kids, twin boys 18 y/o M, and a daughter 28 y/o F. Domiciled in a rental apartment with wife and the twin boys. Born [place] raised in [place] Employed at a '[employer] place. Denies substance use. Reports that over 20 years ago one had one IPP for 1 night due to anxiety and depression. Patient reports that 20 years ago he saw a psychiatrist and therapist and was diagnosed with Anxiety and depression and OCD, stopped 3 years ago when the psychiatrist left from [facility] and commute was unbearable. For the last 3 years Dr Leone prescribed the Zoloft 75 mg. The patient has not received mental health in 3 years and is ready to re-engage. Three weeks ago, the patient started exhibiting severe anxiety and went to ED on 4/16/23. At ED the patient prescribed Sertraline 100 mg 14 day supply and Ativan 1 mg. He reports anxiety symptoms such as waking up in panic, overthinking and having panic attacks. The patient reports depressive symptoms such as depressed mood, anhedonia, intrusive thoughts and crying. His OCD fears are around addiction, and he won't take anything that may cause addiction even prescribed medications, he also gets extremely worried about his health and his anxiety getting worst, “sometimes I feel like I may have tumor” fear about his health in general.  Trauma from father he was an alcoholic, never physical abuse but screaming and loud. Denies S/H/I or self-harming behaviors but reports that when he gets very anxious or feels upset, he has negative thoughts about dying but reports he would never harm himself or others.  PCP Mayur Byrnes.   "\par \par Per  #[id number]\par This is a 52 year old patient who presents for medication management.  The patient reports fairly stable mood, good, sleep and appetite.  The patient denies any symptoms of depression, hailey, or psychosis at this time.  The patient has increased anxiety that impairs their daily functioning. The patient denies any suicidal ideation, homicidal ideation, no auditory or visual hallucinations, or paranoid ideation.  The patient has no medical complaints. The patient denies any drug or alcohol use, and is not smoking at this time. I requested the patient's updated physical and labs from their PCP.  Coping skills were discussed and a safety plan was provided.  The patient was educated on the risks, benefits, and alternatives of their psychiatric medications.  The patient will not engage in psychotherapy at this time.  The patient consents to ongoing medication management.\par  [FreeTextEntry2] : 1 IPP 20 years ago for Anxiety. [FreeTextEntry3] : 100 mg Sertraline\par 1 mg Ativan.

## 2023-05-30 NOTE — PHYSICAL EXAM
[None] : none [Cooperative] : cooperative [Anxious] : anxious [Clear] : clear [Linear/Goal Directed] : linear/goal directed [Average] : average [WNL] : within normal limits [de-identified] : anxious

## 2023-05-30 NOTE — RISK ASSESSMENT
[No, patient denies ideation or behavior] : No, patient denies ideation or behavior [Low acute suicide risk] : Low acute suicide risk [Clinical Interview] : Clinical Interview [Yes] : 1. Passive Ideation: Have you wished you were dead or wished you could go to sleep and not wake up? Yes [Depressed mood/Anhedonia] : depressed mood/anhedonia [Severe anxiety, agitation or panic] : severe anxiety, agitation or panic [None known] : None known [Identifies reasons for living] : identifies reasons for living [Supportive social network of family or friends] : supportive social network of family or friends [Responsibility to children, family, or others] : responsibility to children, family, or others [Fear of death/actual act of killing self] : fear of death or the actual act of killing self [Engaged in work or school] : engaged in work or school [None in the patient's lifetime] : None in the patient's lifetime [No known risk factors] : No known risk factors [Residential stability] : residential stability [Relationship stability] : relationship stability [Employment stability] : employment stability [Affective stability] : affective stability [No] : no

## 2023-05-30 NOTE — PLAN
[FreeTextEntry4] : mood is improving\par \par anxiety is less\par \par health is stable\par \par goal to be symptom free, will hold off on therapy [Admit to Program     (Add Program Admission information to a new column in the Admit/Discharge Flowsheet)] : Admit to program

## 2023-05-30 NOTE — HEALTH RISK ASSESSMENT
[Patient/Caregiver not ready to engage] : , patient/caregiver not ready to engage [AdvancecareDate] : 4/21/23 [Patient/Collateral not ready to engage] : , patient/collateral not ready to engage

## 2023-05-30 NOTE — FAMILY HISTORY
[FreeTextEntry1] : Family composition:Hao Maravilla 53 y/o M,  since 1982, 3 adult kids, twin boys 18 y/o M, and a daughter 28 y/o F. Domiciled in a rental apartment with wife and the twin boys. \par Family history and background: Born [place] raised in [place] and [place]. Employed at a 's [employer].\par Family relationship:Great.\par Pertinent Family Medical, MH and Substance Use History including Adult Child of Alcoholic and child of substance abuse status; history of cancer and heart disease:\par \par Father: Alcoholic\par Son: Depression\par

## 2023-05-30 NOTE — PHYSICAL EXAM
[None] : none [Cooperative] : cooperative [Anxious] : anxious [Clear] : clear [Linear/Goal Directed] : linear/goal directed [Average] : average [WNL] : within normal limits [de-identified] : anxious

## 2023-05-31 DIAGNOSIS — F41.1 GENERALIZED ANXIETY DISORDER: ICD-10-CM

## 2023-05-31 DIAGNOSIS — F41.0 PANIC DISORDER [EPISODIC PAROXYSMAL ANXIETY]: ICD-10-CM

## 2023-06-28 ENCOUNTER — APPOINTMENT (OUTPATIENT)
Dept: PSYCHIATRY | Facility: CLINIC | Age: 53
End: 2023-06-28
Payer: COMMERCIAL

## 2023-06-28 ENCOUNTER — OUTPATIENT (OUTPATIENT)
Dept: OUTPATIENT SERVICES | Facility: HOSPITAL | Age: 53
LOS: 1 days | End: 2023-06-28
Payer: COMMERCIAL

## 2023-06-28 DIAGNOSIS — F41.1 GENERALIZED ANXIETY DISORDER: ICD-10-CM

## 2023-06-28 DIAGNOSIS — F41.0 PANIC DISORDER [EPISODIC PAROXYSMAL ANXIETY]: ICD-10-CM

## 2023-06-28 DIAGNOSIS — F33.1 MAJOR DEPRESSIVE DISORDER, RECURRENT, MODERATE: ICD-10-CM

## 2023-06-28 PROCEDURE — 99214 OFFICE O/P EST MOD 30 MIN: CPT

## 2023-06-28 NOTE — PAST MEDICAL HISTORY
[FreeTextEntry1] :  Intake date: 4/21/23  \par Time of intake: 2PM \par End of Intake: 3:00PM \par \par Patient signed all consents including HIPPA permission to talk to wife if needed. Patient is slding scale, HH referral declined. Patient speaks Bahamian and little english. Patient is okay with using translation services. \par \par ED Tele psych referral on 4/17/23. Patient went to ED for severe anxiety, OCD and depression. Patient is sliding scale. The patient's son attends OPD clinic [facility] and sees Dr Osullivan and Padma.   \par \par Hao Maravilla 51 y/o M,  since 1984, 3 adult kids, twin boys 18 y/o M, and a daughter 28 y/o F. Domiciled in a rental apartment with wife and the twin boys. Born [place] raised in [place] Employed at a 's [employer]. Denies substance use. Reports that over 20 years ago one had one IPP for 1 night due to anxiety and depression. Patient reports that 20 years ago he saw a psychiatrist and therapist and was diagnosed with Anxiety and depression and OCD, stopped 3 years ago when the psychiatrist left from [facility] and commute was unbearable. For the last 3 years Dr Leone prescribed the Zoloft 75 mg. The patient has not received mental health in 3 years and is ready to re-engage. Three weeks ago, the patient started exhibiting severe anxiety and went to ED on 4/16/23. At ED the patient prescribed Sertraline 100 mg 14 day supply and Ativan 1 mg. He reports anxiety symptoms such as waking up in panic, overthinking and having panic attacks. The patient reports depressive symptoms such as depressed mood, anhedonia, intrusive thoughts and crying. His OCD fears are around addiction, and he won't take anything that may cause addiction even prescribed medications, he also gets extremely worried about his health and his anxiety getting worst, “sometimes I feel like I may have tumor” fear about his health in general.  Trauma from father he was an alcoholic, never physical abuse but screaming and loud. Denies S/H/I or self-harming behaviors but reports that when he gets very anxious or feels upset, he has negative thoughts about dying but reports he would never harm himself or others.  PCP Mayur Byrnes.

## 2023-06-28 NOTE — FAMILY HISTORY
[FreeTextEntry1] : Family composition:Hao Maravilla 53 y/o M,  since 1982, 3 adult kids, twin boys 20 y/o M, and a daughter 26 y/o F. Domiciled in a rental apartment with wife and the twin boys. \par Family history and background: Born and raised in [place] and [place]. Employed at a 's [employer].\par Family relationship:Great.\par Pertinent Family Medical, MH and Substance Use History including Adult Child of Alcoholic and child of substance abuse status; history of cancer and heart disease:\par \par Father: Alcoholic\par Son: Depression\par

## 2023-06-28 NOTE — REASON FOR VISIT
[Number can be texted] : number can be texted [OK  to leave message] : OK  to leave message [FreeTextEntry2] : [phone] [FreeTextEntry5] : Djiboutian [FreeTextEntry6] : Hao [FreeTextEntry7] : He/His [Other:___] : [unfilled] [[facility] Provider/Facility] : [facility] Provider/Facility [FreeTextEntry1] : Anxiety/Depression/OCD

## 2023-06-28 NOTE — HISTORY OF PRESENT ILLNESS
[Not Applicable] : Not applicable [FreeTextEntry1] : \par Now in English\par This is a 52 year old patient who presents for medication management.  The patient reports fairly stable mood, good, sleep and appetite.  The patient denies any symptoms of depression, hailey, or psychosis at this time.  The patient has less anxiety that impairs their daily functioning. The patient denies any suicidal ideation, homicidal ideation, no auditory or visual hallucinations, or paranoid ideation.  The patient has no medical complaints. The patient denies any drug or alcohol use, and is not smoking at this time. I requested the patient's updated physical and labs from their PCP.  Coping skills were discussed and a safety plan was provided.  The patient was educated on the risks, benefits, and alternatives of their psychiatric medications.  The patient will not engage in psychotherapy at this time.  The patient consents to ongoing medication management.\par  [FreeTextEntry2] : 1 IPP 20 years ago for Anxiety. [FreeTextEntry3] : 100 mg Sertraline\par 1 mg Ativan.

## 2023-06-28 NOTE — PHYSICAL EXAM
[None] : none [Cooperative] : cooperative [Anxious] : anxious [Clear] : clear [Linear/Goal Directed] : linear/goal directed [Average] : average [WNL] : within normal limits [de-identified] : anxious

## 2023-06-28 NOTE — SOCIAL HISTORY
[FreeTextEntry1] : Employment history: Employed at [employer] .\par Developmental history: Denies.\par Sexual hx/identity Sexual History/ Concern (include sexual orientation and other relevant information) : Heterosexual.\par Race - ethnicity - culture information: American.\par Social supports (friends, Volunteers, club, AA meeting, other meetings ) ? Friends.\par Meaningful Activities: Walking, Jogging, \par \par \par Spiritual Assessment Tool - FICA\par F. What is your odin or belief? Tenriism, but doesn’t practice\par Do you consider yourself spiritual or Judaism? none\par Is there something you believe in that gives meaning to your life? N/A\par I: Is it important in your life? N/A\par What influence does it have on how you take care of yourself? N/A\par How have your beliefs influenced your behavior during this illness? What role do your beliefs play in regaining your health? n/A\par C. Are you part of a spiritual or Judaism community? no\par Is this of support to you and how? N/A\par Is there a person or group of people you really love or who are really important to you? "my wife, my kids"\par H. We have been discussing your belief and supports. What else gives you internal support? "Hope that i will get better"\par What are your sources of hope, strength, comfort and peace? "my family"\par What do you hold on to during difficult times? "my family"\par what sustains you and keeps you going?" my family"\par A. How would you like me, your healthcare provider, to address these issues in your healthcare? " to help me manage my anxiety"\par \par  \par \par \par \par  \par \par \par

## 2023-06-28 NOTE — DISCUSSION/SUMMARY
[FreeTextEntry1] : Assessment Summary: \par  Intake date: 4/21/23  \par Time of intake: 2PM \par End of Intake: 3:00PM \par \par Patient signed all consents including HIPPA permission to talk to wife if needed. Patient is slding scale, HH referral declined. Patient speaks Somali and little english. Patient is okay with using translation services. \par \par ED Tele psych referral on 4/17/23. Patient went to ED for severe anxiety, OCD and depression. Patient is sliding scale. The patient's son attends [facility] clinic [facility] and sees Dr Osullivan and Padma.   \par \par Hao Maravilla 53 y/o M,  since 1982, 3 adult kids, twin boys 18 y/o M, and a daughter 26 y/o F. Domiciled in a rental apartment with wife and the twin boys. Born [place] raised in [place]. Employed at a [employer]s [employer]. Denies substance use. Reports that over 20 years ago one had one IPP for 1 night due to anxiety and depression. Patient reports that 20 years ago he saw a psychiatrist and therapist and was diagnosed with Anxiety and depression and OCD, stopped 3 years ago when the psychiatrist left from [facility] and commute was unbearable. For the last 3 years Dr Leone prescribed the Zoloft 75 mg. The patient has not received mental health in 3 years and is ready to re-engage. Three weeks ago, the patient started exhibiting severe anxiety and went to ED on 4/16/23. At ED the patient prescribed Sertraline 100 mg 14 day supply and Ativan 1 mg. He reports anxiety symptoms such as waking up in panic, overthinking and having panic attacks. The patient reports depressive symptoms such as depressed mood, anhedonia, intrusive thoughts and crying. His OCD fears are around addiction, and he won't take anything that may cause addiction even prescribed medications, he also gets extremely worried about his health and his anxiety getting worst, “sometimes I feel like I may have tumor” fear about his health in general.  Trauma from father he was an alcoholic, never physical abuse but screaming and loud. Denies S/H/I or self-harming behaviors but reports that when he gets very anxious or feels upset, he has negative thoughts about dying but reports he would never harm himself or others.  PCP Mayur Byrnes.      \par  \par Assessed Needs- Functional Domain: Anxiety/Depression, OCD  \par  \par Prioritized Assessed Needs: Anxiety/Depression, OCD   \par  \par Life goals, strengths, barriers, past successes: "to be sane, to get better, lessen my anxiety".    \par  \par Recommendation:\par  \par [ x]      Medication Only\par [ ]     Individual therapy only\par [ ]     Medication and Individual therapy\par [ ]     Group therapy\par \par \par Medication management:\par 1. Raise sertraline 150mg po qam to 175mg po qam\par 2. Continue lorazepam 0.5mg po BID prn anxiety\par \par Follow up in 4-6 weeks\par  [Date of Last Physical Exam: _____] : Date of Last Physical Exam: [unfilled] [Date of Last Annual Labs: _____] : Date of Last Annual Labs: [unfilled] [Annual Review of Systems Completed?] : Annual Review of Systems Completed: Yes [Tobacco Screening Completed?] : Tobacco Screening Completed: Yes [Date of Last HbgA1c: _____] : Date of Last HbgA1c: [unfilled] [Date of Last Lipid Profile: _____] : Date of Last Lipid Profile: [unfilled] [Potential impact of patient’s physical health conditions on psychiatric care?] : Potential impact of patient’s physical health conditions on psychiatric care: No [Does patient require any additional health services or referrals?] : Does patient require any additional health services or referrals: No [Low acute suicide risk] : Low acute suicide risk [No] : No [Not clinically indicated] : Safety Plan completed/updated (for individuals at risk): Not clinically indicated

## 2023-06-29 DIAGNOSIS — F41.1 GENERALIZED ANXIETY DISORDER: ICD-10-CM

## 2023-06-29 DIAGNOSIS — F41.0 PANIC DISORDER [EPISODIC PAROXYSMAL ANXIETY]: ICD-10-CM

## 2023-08-08 ENCOUNTER — OUTPATIENT (OUTPATIENT)
Dept: OUTPATIENT SERVICES | Facility: HOSPITAL | Age: 53
LOS: 1 days | End: 2023-08-08
Payer: COMMERCIAL

## 2023-08-08 ENCOUNTER — APPOINTMENT (OUTPATIENT)
Dept: PSYCHIATRY | Facility: CLINIC | Age: 53
End: 2023-08-08
Payer: COMMERCIAL

## 2023-08-08 DIAGNOSIS — F33.1 MAJOR DEPRESSIVE DISORDER, RECURRENT, MODERATE: ICD-10-CM

## 2023-08-08 DIAGNOSIS — F41.0 PANIC DISORDER [EPISODIC PAROXYSMAL ANXIETY]: ICD-10-CM

## 2023-08-08 DIAGNOSIS — F41.1 GENERALIZED ANXIETY DISORDER: ICD-10-CM

## 2023-08-08 PROCEDURE — 99214 OFFICE O/P EST MOD 30 MIN: CPT

## 2023-08-08 RX ORDER — SERTRALINE 25 MG/1
25 TABLET, FILM COATED ORAL
Qty: 30 | Refills: 1 | Status: DISCONTINUED | COMMUNITY
Start: 2023-06-28 | End: 2023-08-08

## 2023-08-08 NOTE — PHYSICAL EXAM
[None] : none [Cooperative] : cooperative [Anxious] : anxious [Clear] : clear [Linear/Goal Directed] : linear/goal directed [Average] : average [WNL] : within normal limits [de-identified] : anxious

## 2023-08-08 NOTE — PLAN
[Admit to Program     (Add Program Admission information to a new column in the Admit/Discharge Flowsheet)] : Admit to program [FreeTextEntry4] : mood is improving  anxiety is less  health is stable  goal to be symptom free, will hold off on therapy

## 2023-08-08 NOTE — DISCUSSION/SUMMARY
[Date of Last Physical Exam: _____] : Date of Last Physical Exam: [unfilled] [Date of Last Annual Labs: _____] : Date of Last Annual Labs: [unfilled] [Annual Review of Systems Completed?] : Annual Review of Systems Completed: Yes [Tobacco Screening Completed?] : Tobacco Screening Completed: Yes [Date of Last HbgA1c: _____] : Date of Last HbgA1c: [unfilled] [Date of Last Lipid Profile: _____] : Date of Last Lipid Profile: [unfilled] [Low acute suicide risk] : Low acute suicide risk [No] : No [Not clinically indicated] : Safety Plan completed/updated (for individuals at risk): Not clinically indicated [FreeTextEntry1] : Assessment Summary:   Intake date: 4/21/23   Time of intake: 2PM  End of Intake: 3:00PM   Patient signed all consents including HIPPA permission to talk to wife if needed. Patient is slding scale, HH referral declined. Patient speaks Chinese and little english. Patient is okay with using translation services.   ED Tele psych referral on 4/17/23. Patient went to ED for severe anxiety, OCD and depression. Patient is sliding scale. The patient's son attends OPD clinic [facility] and sees Dr Osullivan and Padma.     Hao Marvailla 51 y/o M,  since 1982, 3 adult kids, twin boys 18 y/o M, and a daughter 28 y/o F. Domiciled in a rental apartment with wife and the twin boys. Born [place] raised in [place]. Employed at a 's place. Denies substance use. Reports that over 20 years ago one had one IPP for 1 night due to anxiety and depression. Patient reports that 20 years ago he saw a psychiatrist and therapist and was diagnosed with Anxiety and depression and OCD, stopped 3 years ago when the psychiatrist left from [facility] and commute was unbearable. For the last 3 years Dr Leone prescribed the Zoloft 75 mg. The patient has not received mental health in 3 years and is ready to re-engage. Three weeks ago, the patient started exhibiting severe anxiety and went to ED on 4/16/23. At ED the patient prescribed Sertraline 100 mg 14 day supply and Ativan 1 mg. He reports anxiety symptoms such as waking up in panic, overthinking and having panic attacks. The patient reports depressive symptoms such as depressed mood, anhedonia, intrusive thoughts and crying. His OCD fears are around addiction, and he won't take anything that may cause addiction even prescribed medications, he also gets extremely worried about his health and his anxiety getting worst, "sometimes I feel like I may have tumor" fear about his health in general.  Trauma from father he was an alcoholic, never physical abuse but screaming and loud. Denies S/H/I or self-harming behaviors but reports that when he gets very anxious or feels upset, he has negative thoughts about dying but reports he would never harm himself or others.  PCP Mayur Byrnes.         Assessed Needs- Functional Domain: Anxiety/Depression, OCD     Prioritized Assessed Needs: Anxiety/Depression, OCD      Life goals, strengths, barriers, past successes: "to be sane, to get better, lessen my anxiety".       Recommendation:   [ x]      Medication Only [ ]     Individual therapy only [ ]     Medication and Individual therapy [ ]     Group therapy   Medication management: 1. Raise sertraline 175mg po qam to 200mg po qam 2. Raise lorazepam 0.5mg po BID to 1mg po BID prn anxiety  Follow up in 4 weeks  [Potential impact of patientâ??s physical health conditions on psychiatric care?] : Potential impact of patientâ??s physical health conditions on psychiatric care: No [Does patient require any additional health services or referrals?] : Does patient require any additional health services or referrals: No

## 2023-08-08 NOTE — FAMILY HISTORY
[FreeTextEntry1] : Family composition:Hao Maravilla 51 y/o M,  since 1982, 3 adult kids, twin boys 20 y/o M, and a daughter 26 y/o F. Domiciled in a rental apartment with wife and the twin boys. \par  Family history and background: Born and raised in [place] and [place]. Employed at a 's place.\par  Family relationship:Great.\par  Pertinent Family Medical, MH and Substance Use History including Adult Child of Alcoholic and child of substance abuse status; history of cancer and heart disease:\par  \par  Father: Alcoholic\par  Son: Depression\par

## 2023-08-08 NOTE — HISTORY OF PRESENT ILLNESS
[Not Applicable] : Not applicable [FreeTextEntry1] : Now in English This is a 52 year old patient who presents for medication management.  The patient reports fairly stable mood, good, sleep and appetite.  The patient denies any symptoms of depression, hailey, or psychosis at this time.  The patient has less anxiety that impairs their daily functioning. The patient denies any suicidal ideation, homicidal ideation, no auditory or visual hallucinations, or paranoid ideation.  The patient has no medical complaints. The patient denies any drug or alcohol use, and is not smoking at this time. I requested the patient's updated physical and labs from their PCP.  Coping skills were discussed and a safety plan was provided.  The patient was educated on the risks, benefits, and alternatives of their psychiatric medications.  The patient will not engage in psychotherapy at this time.  The patient consents to ongoing medication management.  [FreeTextEntry2] : 1 IPP 20 years ago for Anxiety. [FreeTextEntry3] : 100 mg Sertraline\par  1 mg Ativan.

## 2023-08-08 NOTE — PAST MEDICAL HISTORY
[FreeTextEntry1] :  Intake date: 4/21/23  \par  Time of intake: 2PM \par  End of Intake: 3:00PM \par  \par  Patient signed all consents including HIPPA permission to talk to wife if needed. Patient is slding scale, HH referral declined. Patient speaks Colombian and little english. Patient is okay with using translation services. \par  \par  ED Tele psych referral on 4/17/23. Patient went to ED for severe anxiety, OCD and depression. Patient is sliding scale. The patient's son attends OPD clinic [facility] and sees Dr Osullivan and Padma.   \par  \par  Hao Maravilla 51 y/o M,  since 1984, 3 adult kids, twin boys 18 y/o M, and a daughter 26 y/o F. Domiciled in a rental apartment with wife and the twin boys. Born [place] raised in [place]. Employed at a 's place. Denies substance use. Reports that over 20 years ago one had one IPP for 1 night due to anxiety and depression. Patient reports that 20 years ago he saw a psychiatrist and therapist and was diagnosed with Anxiety and depression and OCD, stopped 3 years ago when the psychiatrist left from [facility] and commute was unbearable. For the last 3 years Dr Leone prescribed the Zoloft 75 mg. The patient has not received mental health in 3 years and is ready to re-engage. Three weeks ago, the patient started exhibiting severe anxiety and went to ED on 4/16/23. At ED the patient prescribed Sertraline 100 mg 14 day supply and Ativan 1 mg. He reports anxiety symptoms such as waking up in panic, overthinking and having panic attacks. The patient reports depressive symptoms such as depressed mood, anhedonia, intrusive thoughts and crying. His OCD fears are around addiction, and he won't take anything that may cause addiction even prescribed medications, he also gets extremely worried about his health and his anxiety getting worst, "sometimes I feel like I may have tumor" fear about his health in general.  Trauma from father he was an alcoholic, never physical abuse but screaming and loud. Denies S/H/I or self-harming behaviors but reports that when he gets very anxious or feels upset, he has negative thoughts about dying but reports he would never harm himself or others.  PCP Mayur Byrnes.

## 2023-08-08 NOTE — REASON FOR VISIT
[Number can be texted] : number can be texted [OK  to leave message] : OK  to leave message [Other:___] : [unfilled] [[facility] Provider/Facility] : [facility] Provider/Facility [FreeTextEntry2] : [phone] [FreeTextEntry5] : Northern Irish [FreeTextEntry6] : Hao [FreeTextEntry7] : He/His [FreeTextEntry1] : Anxiety/Depression/OCD

## 2023-08-08 NOTE — HEALTH RISK ASSESSMENT
[Patient/Caregiver not ready to engage] : , patient/caregiver not ready to engage [Patient/Collateral not ready to engage] : , patient/collateral not ready to engage [AdvancecareDate] : 4/21/23

## 2023-08-08 NOTE — SOCIAL HISTORY
[FreeTextEntry1] : Employment history: Employed at [employer] .\par  Developmental history: Denies.\par  Sexual hx/identity Sexual History/ Concern (include sexual orientation and other relevant information) : Heterosexual.\par  Race - ethnicity - culture information: Eritrean.\par  Social supports (friends, Volunteers, club, AA meeting, other meetings ) ? Friends.\par  Meaningful Activities: Walking, Jogging, \par  \par  \par  Spiritual Assessment Tool - FICA\par  F. What is your odin or belief? Mosque, but doesn't practice\par  Do you consider yourself spiritual or Rastafari? none\par  Is there something you believe in that gives meaning to your life? N/A\par  I: Is it important in your life? N/A\par  What influence does it have on how you take care of yourself? N/A\par  How have your beliefs influenced your behavior during this illness? What role do your beliefs play in regaining your health? n/A\par  C. Are you part of a spiritual or Rastafari community? no\par  Is this of support to you and how? N/A\par  Is there a person or group of people you really love or who are really important to you? "my wife, my kids"\par  H. We have been discussing your belief and supports. What else gives you internal support? "Hope that i will get better"\par  What are your sources of hope, strength, comfort and peace? "my family"\par  What do you hold on to during difficult times? "my family"\par  what sustains you and keeps you going?" my family"\par  A. How would you like me, your healthcare provider, to address these issues in your healthcare? " to help me manage my anxiety"\par  \par   \par  \par  \par  \par   \par  \par  \par

## 2023-08-09 DIAGNOSIS — F41.0 PANIC DISORDER [EPISODIC PAROXYSMAL ANXIETY]: ICD-10-CM

## 2023-08-09 DIAGNOSIS — F41.1 GENERALIZED ANXIETY DISORDER: ICD-10-CM

## 2023-09-05 ENCOUNTER — APPOINTMENT (OUTPATIENT)
Dept: PSYCHIATRY | Facility: CLINIC | Age: 53
End: 2023-09-05
Payer: COMMERCIAL

## 2023-09-05 ENCOUNTER — OUTPATIENT (OUTPATIENT)
Dept: OUTPATIENT SERVICES | Facility: HOSPITAL | Age: 53
LOS: 1 days | End: 2023-09-05
Payer: COMMERCIAL

## 2023-09-05 DIAGNOSIS — F33.1 MAJOR DEPRESSIVE DISORDER, RECURRENT, MODERATE: ICD-10-CM

## 2023-09-05 PROCEDURE — 99214 OFFICE O/P EST MOD 30 MIN: CPT

## 2023-09-05 RX ORDER — SERTRALINE HYDROCHLORIDE 50 MG/1
50 TABLET, FILM COATED ORAL DAILY
Qty: 30 | Refills: 6 | Status: DISCONTINUED | COMMUNITY
Start: 2019-10-11 | End: 2023-09-05

## 2023-09-05 NOTE — SOCIAL HISTORY
[FreeTextEntry1] : Employment history: Employed at [employer] .\par  Developmental history: Denies.\par  Sexual hx/identity Sexual History/ Concern (include sexual orientation and other relevant information) : Heterosexual.\par  Race - ethnicity - culture information: Puerto Rican.\par  Social supports (friends, Volunteers, club, AA meeting, other meetings ) ? Friends.\par  Meaningful Activities: Walking, Jogging, \par  \par  \par  Spiritual Assessment Tool - FICA\par  F. What is your odin or belief? Mu-ism, but doesn't practice\par  Do you consider yourself spiritual or Caodaism? none\par  Is there something you believe in that gives meaning to your life? N/A\par  I: Is it important in your life? N/A\par  What influence does it have on how you take care of yourself? N/A\par  How have your beliefs influenced your behavior during this illness? What role do your beliefs play in regaining your health? n/A\par  C. Are you part of a spiritual or Caodaism community? no\par  Is this of support to you and how? N/A\par  Is there a person or group of people you really love or who are really important to you? "my wife, my kids"\par  H. We have been discussing your belief and supports. What else gives you internal support? "Hope that i will get better"\par  What are your sources of hope, strength, comfort and peace? "my family"\par  What do you hold on to during difficult times? "my family"\par  what sustains you and keeps you going?" my family"\par  A. How would you like me, your healthcare provider, to address these issues in your healthcare? " to help me manage my anxiety"\par  \par   \par  \par  \par  \par   \par  \par  \par

## 2023-09-05 NOTE — PHYSICAL EXAM
[None] : none [Cooperative] : cooperative [Anxious] : anxious [Clear] : clear [Linear/Goal Directed] : linear/goal directed [Average] : average [WNL] : within normal limits [de-identified] : anxious

## 2023-09-05 NOTE — DISCUSSION/SUMMARY
[Date of Last Physical Exam: _____] : Date of Last Physical Exam: [unfilled] [Date of Last Annual Labs: _____] : Date of Last Annual Labs: [unfilled] [Annual Review of Systems Completed?] : Annual Review of Systems Completed: Yes [Tobacco Screening Completed?] : Tobacco Screening Completed: Yes [Date of Last HbgA1c: _____] : Date of Last HbgA1c: [unfilled] [Date of Last Lipid Profile: _____] : Date of Last Lipid Profile: [unfilled] [Low acute suicide risk] : Low acute suicide risk [No] : No [Not clinically indicated] : Safety Plan completed/updated (for individuals at risk): Not clinically indicated [FreeTextEntry1] : Assessment Summary:   Intake date: 4/21/23   Time of intake: 2PM  End of Intake: 3:00PM   Patient signed all consents including HIPPA permission to talk to wife if needed. Patient is slding scale, HH referral declined. Patient speaks Irish and little english. Patient is okay with using translation services.   ED Tele psych referral on 4/17/23. Patient went to ED for severe anxiety, OCD and depression. Patient is sliding scale. The patient's son attends OPD clinic [facility] and sees Dr Osullivan and Padma.     Hao Maravilla 53 y/o M,  since 1984, 3 adult kids, twin boys 18 y/o M, and a daughter 28 y/o F. Domiciled in a rental apartment with wife and the twin boys. Born [place] raised in [place]. Employed at a 's place. Denies substance use. Reports that over 20 years ago one had one IPP for 1 night due to anxiety and depression. Patient reports that 20 years ago he saw a psychiatrist and therapist and was diagnosed with Anxiety and depression and OCD, stopped 3 years ago when the psychiatrist left from [facility] and commute was unbearable. For the last 3 years Dr Leone prescribed the Zoloft 75 mg. The patient has not received mental health in 3 years and is ready to re-engage. Three weeks ago, the patient started exhibiting severe anxiety and went to ED on 4/16/23. At ED the patient prescribed Sertraline 100 mg 14 day supply and Ativan 1 mg. He reports anxiety symptoms such as waking up in panic, overthinking and having panic attacks. The patient reports depressive symptoms such as depressed mood, anhedonia, intrusive thoughts and crying. His OCD fears are around addiction, and he won't take anything that may cause addiction even prescribed medications, he also gets extremely worried about his health and his anxiety getting worst, "sometimes I feel like I may have tumor" fear about his health in general.  Trauma from father he was an alcoholic, never physical abuse but screaming and loud. Denies S/H/I or self-harming behaviors but reports that when he gets very anxious or feels upset, he has negative thoughts about dying but reports he would never harm himself or others.  PCP Mayur Byrnes.         Assessed Needs- Functional Domain: Anxiety/Depression, OCD     Prioritized Assessed Needs: Anxiety/Depression, OCD      Life goals, strengths, barriers, past successes: "to be sane, to get better, lessen my anxiety".       Recommendation:   [ x]      Medication Only [ ]     Individual therapy only [ ]     Medication and Individual therapy [ ]     Group therapy   Medication management: 1. Raise sertraline 200mg po qam to 225mg po qam 2. Continue lorazepam 0.5mg- 1mg po BID prn anxiety  Follow up in 4 weeks  [Potential impact of patientâ??s physical health conditions on psychiatric care?] : Potential impact of patientâ??s physical health conditions on psychiatric care: No [Does patient require any additional health services or referrals?] : Does patient require any additional health services or referrals: No

## 2023-09-05 NOTE — FAMILY HISTORY
[FreeTextEntry1] : Family composition:Hao Maravilla 53 y/o M,  since 1982, 3 adult kids, twin boys 18 y/o M, and a daughter 26 y/o F. Domiciled in a rental apartment with wife and the twin boys. \par  Family history and background: Born [place] raised in [place]. Employed at a 's place.\par  Family relationship:Great.\par  Pertinent Family Medical, MH and Substance Use History including Adult Child of Alcoholic and child of substance abuse status; history of cancer and heart disease:\par  \par  Father: Alcoholic\par  Son: Depression\par

## 2023-09-05 NOTE — REASON FOR VISIT
[Number can be texted] : number can be texted [OK  to leave message] : OK  to leave message [Other:___] : [unfilled] [[facility] Provider/Facility] : [facility] Provider/Facility [FreeTextEntry2] : [phone] [FreeTextEntry5] : Cypriot [FreeTextEntry6] : Hao [FreeTextEntry7] : He/His [FreeTextEntry1] : Anxiety/Depression/OCD

## 2023-09-05 NOTE — PAST MEDICAL HISTORY
[FreeTextEntry1] :  Intake date: 4/21/23  \par  Time of intake: 2PM \par  End of Intake: 3:00PM \par  \par  Patient signed all consents including HIPPA permission to talk to wife if needed. Patient is slding scale, HH referral declined. Patient speaks Jordanian and little english. Patient is okay with using translation services. \par  \par  ED Tele psych referral on 4/17/23. Patient went to ED for severe anxiety, OCD and depression. Patient is sliding scale. The patient's son attends OPD clinic [facility] and sees Dr Osullivan and Padma.   \par  \par  Hao Maravilla 51 y/o M,  since 1984, 3 adult kids, twin boys 20 y/o M, and a daughter 26 y/o F. Domiciled in a rental apartment with wife and the twin boys. Born [place] raised in [place] Employed at a 's place. Denies substance use. Reports that over 20 years ago one had one IPP for 1 night due to anxiety and depression. Patient reports that 20 years ago he saw a psychiatrist and therapist and was diagnosed with Anxiety and depression and OCD, stopped 3 years ago when the psychiatrist left from [facility] and commute was unbearable. For the last 3 years Dr Leone prescribed the Zoloft 75 mg. The patient has not received mental health in 3 years and is ready to re-engage. Three weeks ago, the patient started exhibiting severe anxiety and went to ED on 4/16/23. At ED the patient prescribed Sertraline 100 mg 14 day supply and Ativan 1 mg. He reports anxiety symptoms such as waking up in panic, overthinking and having panic attacks. The patient reports depressive symptoms such as depressed mood, anhedonia, intrusive thoughts and crying. His OCD fears are around addiction, and he won't take anything that may cause addiction even prescribed medications, he also gets extremely worried about his health and his anxiety getting worst, "sometimes I feel like I may have tumor" fear about his health in general.  Trauma from father he was an alcoholic, never physical abuse but screaming and loud. Denies S/H/I or self-harming behaviors but reports that when he gets very anxious or feels upset, he has negative thoughts about dying but reports he would never harm himself or others.  PCP Mayur Byrnes.

## 2023-09-06 DIAGNOSIS — F33.1 MAJOR DEPRESSIVE DISORDER, RECURRENT, MODERATE: ICD-10-CM

## 2023-10-03 ENCOUNTER — APPOINTMENT (OUTPATIENT)
Dept: PSYCHIATRY | Facility: CLINIC | Age: 53
End: 2023-10-03
Payer: COMMERCIAL

## 2023-10-03 ENCOUNTER — OUTPATIENT (OUTPATIENT)
Dept: OUTPATIENT SERVICES | Facility: HOSPITAL | Age: 53
LOS: 1 days | End: 2023-10-03
Payer: COMMERCIAL

## 2023-10-03 DIAGNOSIS — F33.1 MAJOR DEPRESSIVE DISORDER, RECURRENT, MODERATE: ICD-10-CM

## 2023-10-03 PROCEDURE — 99214 OFFICE O/P EST MOD 30 MIN: CPT

## 2023-10-03 PROCEDURE — 99214 OFFICE O/P EST MOD 30 MIN: CPT | Mod: 95

## 2023-10-04 DIAGNOSIS — F33.1 MAJOR DEPRESSIVE DISORDER, RECURRENT, MODERATE: ICD-10-CM

## 2023-10-11 ENCOUNTER — APPOINTMENT (OUTPATIENT)
Dept: INTERNAL MEDICINE | Facility: CLINIC | Age: 53
End: 2023-10-11

## 2023-10-11 VITALS
HEART RATE: 77 BPM | SYSTOLIC BLOOD PRESSURE: 110 MMHG | DIASTOLIC BLOOD PRESSURE: 68 MMHG | TEMPERATURE: 97 F | BODY MASS INDEX: 29.98 KG/M2 | WEIGHT: 191 LBS | OXYGEN SATURATION: 97 % | HEIGHT: 67 IN

## 2023-10-11 RX ORDER — SILDENAFIL 20 MG/1
20 TABLET ORAL DAILY
Qty: 90 | Refills: 1 | Status: DISCONTINUED | COMMUNITY
Start: 2022-09-02 | End: 2023-10-11

## 2023-10-11 RX ORDER — HYDROCORTISONE 25 MG/G
2.5 CREAM TOPICAL
Qty: 60 | Refills: 2 | Status: DISCONTINUED | COMMUNITY
Start: 2021-04-12 | End: 2023-10-11

## 2023-10-11 RX ORDER — CHOLECALCIFEROL (VITAMIN D3) 1250 MCG
1.25 MG CAPSULE ORAL
Qty: 12 | Refills: 1 | Status: DISCONTINUED | COMMUNITY
Start: 2022-02-03 | End: 2023-10-11

## 2023-10-11 RX ORDER — CICLOPIROX 2.28 G/ML
8 SOLUTION TOPICAL
Qty: 1 | Refills: 3 | Status: DISCONTINUED | COMMUNITY
Start: 2022-09-02 | End: 2023-10-11

## 2023-10-23 DIAGNOSIS — Z00.00 ENCOUNTER FOR GENERAL ADULT MEDICAL EXAMINATION W/OUT ABNORMAL FINDINGS: ICD-10-CM

## 2023-10-25 ENCOUNTER — OUTPATIENT (OUTPATIENT)
Dept: OUTPATIENT SERVICES | Facility: HOSPITAL | Age: 53
LOS: 1 days | End: 2023-10-25
Payer: COMMERCIAL

## 2023-10-25 ENCOUNTER — APPOINTMENT (OUTPATIENT)
Dept: INTERNAL MEDICINE | Facility: CLINIC | Age: 53
End: 2023-10-25
Payer: COMMERCIAL

## 2023-10-25 VITALS
HEART RATE: 67 BPM | HEIGHT: 67 IN | SYSTOLIC BLOOD PRESSURE: 117 MMHG | OXYGEN SATURATION: 98 % | DIASTOLIC BLOOD PRESSURE: 76 MMHG | WEIGHT: 191 LBS | BODY MASS INDEX: 29.98 KG/M2

## 2023-10-25 DIAGNOSIS — R37 SEXUAL DYSFUNCTION, UNSPECIFIED: ICD-10-CM

## 2023-10-25 DIAGNOSIS — F41.9 ANXIETY DISORDER, UNSPECIFIED: ICD-10-CM

## 2023-10-25 DIAGNOSIS — Z00.00 ENCOUNTER FOR GENERAL ADULT MEDICAL EXAMINATION WITHOUT ABNORMAL FINDINGS: ICD-10-CM

## 2023-10-25 DIAGNOSIS — E78.5 HYPERLIPIDEMIA, UNSPECIFIED: ICD-10-CM

## 2023-10-25 PROCEDURE — 99213 OFFICE O/P EST LOW 20 MIN: CPT

## 2023-10-25 RX ORDER — ATORVASTATIN CALCIUM 20 MG/1
20 TABLET, FILM COATED ORAL DAILY
Qty: 90 | Refills: 0 | Status: ACTIVE | COMMUNITY
Start: 2023-10-25 | End: 1900-01-01

## 2023-10-28 DIAGNOSIS — F41.9 ANXIETY DISORDER, UNSPECIFIED: ICD-10-CM

## 2023-10-28 DIAGNOSIS — R37 SEXUAL DYSFUNCTION, UNSPECIFIED: ICD-10-CM

## 2023-10-28 DIAGNOSIS — E78.5 HYPERLIPIDEMIA, UNSPECIFIED: ICD-10-CM

## 2023-10-31 ENCOUNTER — APPOINTMENT (OUTPATIENT)
Dept: PSYCHIATRY | Facility: CLINIC | Age: 53
End: 2023-10-31
Payer: COMMERCIAL

## 2023-10-31 ENCOUNTER — OUTPATIENT (OUTPATIENT)
Dept: OUTPATIENT SERVICES | Facility: HOSPITAL | Age: 53
LOS: 1 days | End: 2023-10-31
Payer: COMMERCIAL

## 2023-10-31 DIAGNOSIS — F33.1 MAJOR DEPRESSIVE DISORDER, RECURRENT, MODERATE: ICD-10-CM

## 2023-10-31 DIAGNOSIS — F41.1 GENERALIZED ANXIETY DISORDER: ICD-10-CM

## 2023-10-31 DIAGNOSIS — F42.9 OBSESSIVE-COMPULSIVE DISORDER, UNSPECIFIED: ICD-10-CM

## 2023-10-31 DIAGNOSIS — F41.0 PANIC DISORDER [EPISODIC PAROXYSMAL ANXIETY]: ICD-10-CM

## 2023-10-31 PROCEDURE — 99214 OFFICE O/P EST MOD 30 MIN: CPT

## 2023-10-31 PROCEDURE — 99214 OFFICE O/P EST MOD 30 MIN: CPT | Mod: 95

## 2023-11-01 DIAGNOSIS — F42.9 OBSESSIVE-COMPULSIVE DISORDER, UNSPECIFIED: ICD-10-CM

## 2023-11-01 DIAGNOSIS — F41.0 PANIC DISORDER [EPISODIC PAROXYSMAL ANXIETY]: ICD-10-CM

## 2023-11-01 DIAGNOSIS — F41.1 GENERALIZED ANXIETY DISORDER: ICD-10-CM

## 2023-11-28 ENCOUNTER — APPOINTMENT (OUTPATIENT)
Dept: PSYCHIATRY | Facility: CLINIC | Age: 53
End: 2023-11-28
Payer: COMMERCIAL

## 2023-11-28 ENCOUNTER — OUTPATIENT (OUTPATIENT)
Dept: OUTPATIENT SERVICES | Facility: HOSPITAL | Age: 53
LOS: 1 days | End: 2023-11-28
Payer: COMMERCIAL

## 2023-11-28 DIAGNOSIS — F33.1 MAJOR DEPRESSIVE DISORDER, RECURRENT, MODERATE: ICD-10-CM

## 2023-11-28 PROCEDURE — 99214 OFFICE O/P EST MOD 30 MIN: CPT

## 2023-11-29 DIAGNOSIS — F33.1 MAJOR DEPRESSIVE DISORDER, RECURRENT, MODERATE: ICD-10-CM

## 2023-12-12 ENCOUNTER — APPOINTMENT (OUTPATIENT)
Dept: NEUROLOGY | Facility: CLINIC | Age: 53
End: 2023-12-12

## 2024-01-02 ENCOUNTER — APPOINTMENT (OUTPATIENT)
Dept: PSYCHIATRY | Facility: CLINIC | Age: 54
End: 2024-01-02

## 2024-01-02 ENCOUNTER — APPOINTMENT (OUTPATIENT)
Dept: PSYCHIATRY | Facility: CLINIC | Age: 54
End: 2024-01-02
Payer: SELF-PAY

## 2024-01-02 ENCOUNTER — OUTPATIENT (OUTPATIENT)
Dept: OUTPATIENT SERVICES | Facility: HOSPITAL | Age: 54
LOS: 1 days | End: 2024-01-02

## 2024-01-02 ENCOUNTER — OUTPATIENT (OUTPATIENT)
Dept: OUTPATIENT SERVICES | Facility: HOSPITAL | Age: 54
LOS: 1 days | End: 2024-01-02
Payer: SELF-PAY

## 2024-01-02 DIAGNOSIS — F42.9 OBSESSIVE-COMPULSIVE DISORDER, UNSPECIFIED: ICD-10-CM

## 2024-01-02 DIAGNOSIS — F41.0 PANIC DISORDER [EPISODIC PAROXYSMAL ANXIETY]: ICD-10-CM

## 2024-01-02 DIAGNOSIS — F33.1 MAJOR DEPRESSIVE DISORDER, RECURRENT, MODERATE: ICD-10-CM

## 2024-01-02 DIAGNOSIS — F41.1 GENERALIZED ANXIETY DISORDER: ICD-10-CM

## 2024-01-02 PROCEDURE — 99214 OFFICE O/P EST MOD 30 MIN: CPT

## 2024-01-02 NOTE — DISCUSSION/SUMMARY
[Date of Last Physical Exam: _____] : Date of Last Physical Exam: [unfilled] [Date of Last Annual Labs: _____] : Date of Last Annual Labs: [unfilled] [Annual Review of Systems Completed?] : Annual Review of Systems Completed: Yes [Tobacco Screening Completed?] : Tobacco Screening Completed: Yes [Date of Last HbgA1c: _____] : Date of Last HbgA1c: [unfilled] [Date of Last Lipid Profile: _____] : Date of Last Lipid Profile: [unfilled] [Low acute suicide risk] : Low acute suicide risk [No] : No [Not clinically indicated] : Safety Plan completed/updated (for individuals at risk): Not clinically indicated [FreeTextEntry1] : Assessment Summary:   Intake date: 4/21/23   Time of intake: 2PM  End of Intake: 3:00PM   Patient signed all consents including HIPPA permission to talk to wife if needed. Patient is slding scale, HH referral declined. Patient speaks Icelandic and little english. Patient is okay with using translation services.   ED Tele psych referral on 4/17/23. Patient went to ED for severe anxiety, OCD and depression. Patient is sliding scale. The patient's son attends [facility] and sees Dr Osullivan and Padma.     Hao Maravilla 53 y/o M,  since 1982, 3 adult kids, twin boys 20 y/o M, and a daughter 26 y/o F. Domiciled in a rental apartment with wife and the twin boys. Born and raised in [place] and [place]. Employed at a 's place. Denies substance use. Reports that over 20 years ago one had one IPP for 1 night due to anxiety and depression. Patient reports that 20 years ago he saw a psychiatrist and therapist and was diagnosed with Anxiety and depression and OCD, stopped 3 years ago when the psychiatrist left from [facility] and commute was unbearable. For the last 3 years Dr Leone prescribed the Zoloft 75 mg. The patient has not received mental health in 3 years and is ready to re-engage. Three weeks ago, the patient started exhibiting severe anxiety and went to ED on 4/16/23. At ED the patient prescribed Sertraline 100 mg 14 day supply and Ativan 1 mg. He reports anxiety symptoms such as waking up in panic, overthinking and having panic attacks. The patient reports depressive symptoms such as depressed mood, anhedonia, intrusive thoughts and crying. His OCD fears are around addiction, and he won't take anything that may cause addiction even prescribed medications, he also gets extremely worried about his health and his anxiety getting worst, "sometimes I feel like I may have tumor" fear about his health in general.  Trauma from father he was an alcoholic, never physical abuse but screaming and loud. Denies S/H/I or self-harming behaviors but reports that when he gets very anxious or feels upset, he has negative thoughts about dying but reports he would never harm himself or others.  PCP Mayur Byrnes.         Assessed Needs- Functional Domain: Anxiety/Depression, OCD     Prioritized Assessed Needs: Anxiety/Depression, OCD      Life goals, strengths, barriers, past successes: "to be sane, to get better, lessen my anxiety".       Recommendation:   [ x]      Medication Only [ ]     Individual therapy only [ ]     Medication and Individual therapy [ ]     Group therapy   Medication management: 1. Continue sertraline 225mg po qam 2. Continue lorazepam 0.5mg- 1mg po BID prn anxiety 3. continue abilify 2mg po qam to augment anxiety/OCD  Follow up in 4-5 weeks  [Potential impact of patientâ??s physical health conditions on psychiatric care?] : Potential impact of patient's physical health conditions on psychiatric care: No [Does patient require any additional health services or referrals?] : Does patient require any additional health services or referrals: No

## 2024-01-02 NOTE — SOCIAL HISTORY
[FreeTextEntry1] : Employment history: Employed at [employer] .\par  Developmental history: Denies.\par  Sexual hx/identity Sexual History/ Concern (include sexual orientation and other relevant information) : Heterosexual.\par  Race - ethnicity - culture information: Iranian.\par  Social supports (friends, Volunteers, club, AA meeting, other meetings ) ? Friends.\par  Meaningful Activities: Walking, Jogging, \par  \par  \par  Spiritual Assessment Tool - FICA\par  F. What is your odin or belief? Latter day, but doesn't practice\par  Do you consider yourself spiritual or Faith? none\par  Is there something you believe in that gives meaning to your life? N/A\par  I: Is it important in your life? N/A\par  What influence does it have on how you take care of yourself? N/A\par  How have your beliefs influenced your behavior during this illness? What role do your beliefs play in regaining your health? n/A\par  C. Are you part of a spiritual or Faith community? no\par  Is this of support to you and how? N/A\par  Is there a person or group of people you really love or who are really important to you? "my wife, my kids"\par  H. We have been discussing your belief and supports. What else gives you internal support? "Hope that i will get better"\par  What are your sources of hope, strength, comfort and peace? "my family"\par  What do you hold on to during difficult times? "my family"\par  what sustains you and keeps you going?" my family"\par  A. How would you like me, your healthcare provider, to address these issues in your healthcare? " to help me manage my anxiety"\par  \par   \par  \par  \par  \par   \par  \par  \par

## 2024-01-02 NOTE — SOCIAL HISTORY
[FreeTextEntry1] : Employment history: Employed at [employer] .\par  Developmental history: Denies.\par  Sexual hx/identity Sexual History/ Concern (include sexual orientation and other relevant information) : Heterosexual.\par  Race - ethnicity - culture information: Irish.\par  Social supports (friends, Volunteers, club, AA meeting, other meetings ) ? Friends.\par  Meaningful Activities: Walking, Jogging, \par  \par  \par  Spiritual Assessment Tool - FICA\par  F. What is your odin or belief? Amish, but doesn't practice\par  Do you consider yourself spiritual or Jehovah's witness? none\par  Is there something you believe in that gives meaning to your life? N/A\par  I: Is it important in your life? N/A\par  What influence does it have on how you take care of yourself? N/A\par  How have your beliefs influenced your behavior during this illness? What role do your beliefs play in regaining your health? n/A\par  C. Are you part of a spiritual or Jehovah's witness community? no\par  Is this of support to you and how? N/A\par  Is there a person or group of people you really love or who are really important to you? "my wife, my kids"\par  H. We have been discussing your belief and supports. What else gives you internal support? "Hope that i will get better"\par  What are your sources of hope, strength, comfort and peace? "my family"\par  What do you hold on to during difficult times? "my family"\par  what sustains you and keeps you going?" my family"\par  A. How would you like me, your healthcare provider, to address these issues in your healthcare? " to help me manage my anxiety"\par  \par   \par  \par  \par  \par   \par  \par  \par

## 2024-01-02 NOTE — DISCUSSION/SUMMARY
[Low acute suicide risk] : Low acute suicide risk [No] : No [Not clinically indicated] : Safety Plan completed/updated (for individuals at risk): Not clinically indicated [Initial Plan] : Initial Plan [Able to manage surrounding demands and opportunities] : able to manage surrounding demands and opportunities [Able to exercise self-direction] : able to exercise self-direction [Able to set and pursue goals] : able to set and pursue goals [Adherent to treatment recommendations] : adherent to treatment recommendations [Articulate] : articulate [Attempting to realize their potential] : Attempting to realize their potential [Awareness of substance use issues] : awareness of substance use issues [Cognitively intact] : cognitively intact [Insightful] : insightful [Creative] : creative [Intelligent] : intelligent [Motivated to participate in treatment] : motivated to participate in treatment [Motivated and ready for change] : motivated and ready for change [Has vocational interests or hobbies] : has vocational interests or hobbies [Part of a supportive family] : part of a supportive family [Steady employment] : steady employment [Housing stability] : housing stability [High level of education] : high level of education [English fluency] : English fluency [Initial] : Initial [Mental Health] : Mental Health [Continued - Progress made] : Continued - Progress made: [every ___ weeks] : every [unfilled] weeks [Improvement in symptoms as evidenced by:] : Improvement in symptoms as evidenced by: [Yes] : Yes [Potential impact of patient’s physical health conditions on psychiatric care?] : Potential impact of patient’s physical health conditions on psychiatric care: No [FreeTextEntry2] : 5/1/24 [FreeTextEntry3] : 5/1/23 [de-identified] : on a daily basis [FreeTextEntry1] : depression [FreeTextEntry4] : euthymic mood on a daily basis [FreeTextEntry5] : on a daily basis [de-identified] : symptom free off medication [Annual Review of Systems Completed?] : Annual Review of Systems Completed: No [Tobacco Screening Completed?] : Tobacco Screening Completed: No [Potential impact of patient?s physical health conditions on psychiatric care?] : Potential impact of patient?s physical health conditions on psychiatric care: No [Does patient require any additional health services or referrals?] : Does patient require any additional health services or referrals: No

## 2024-01-02 NOTE — REASON FOR VISIT
[Number can be texted] : number can be texted [OK  to leave message] : OK  to leave message [FreeTextEntry2] : [phone] [FreeTextEntry5] : Albanian [FreeTextEntry6] : Hao [FreeTextEntry7] : He/His [Other:___] : [unfilled] [[facility] Provider/Facility] : [facility] Provider/Facility [FreeTextEntry1] : Anxiety/Depression/OCD

## 2024-01-02 NOTE — PAST MEDICAL HISTORY
[FreeTextEntry1] :  Intake date: 4/21/23  \par  Time of intake: 2PM \par  End of Intake: 3:00PM \par  \par  Patient signed all consents including HIPPA permission to talk to wife if needed. Patient is slding scale, HH referral declined. Patient speaks Salvadorean and little english. Patient is okay with using translation services. \par  \par  ED Tele psych referral on 4/17/23. Patient went to ED for severe anxiety, OCD and depression. Patient is sliding scale. The patient's son attends [facility]PD [facility] and sees Dr Osullivan and Padma.   \par  \par  Hao Maravilla 53 y/o M,  since 1984, 3 adult kids, twin boys 20 y/o M, and a daughter 28 y/o F. Domiciled in a rental apartment with wife and the twin boys. Born [place] raised in [place]. Employed at a '[employer]. Denies substance use. Reports that over 20 years ago one had one IPP for 1 night due to anxiety and depression. Patient reports that 20 years ago he saw a psychiatrist and therapist and was diagnosed with Anxiety and depression and OCD, stopped 3 years ago when the psychiatrist left from [facility] and commute was unbearable. For the last 3 years Dr Leone prescribed the Zoloft 75 mg. The patient has not received mental health in 3 years and is ready to re-engage. Three weeks ago, the patient started exhibiting severe anxiety and went to ED on 4/16/23. At ED the patient prescribed Sertraline 100 mg 14 day supply and Ativan 1 mg. He reports anxiety symptoms such as waking up in panic, overthinking and having panic attacks. The patient reports depressive symptoms such as depressed mood, anhedonia, intrusive thoughts and crying. His OCD fears are around addiction, and he won't take anything that may cause addiction even prescribed medications, he also gets extremely worried about his health and his anxiety getting worst, "sometimes I feel like I may have tumor" fear about his health in general.  Trauma from father he was an alcoholic, never physical abuse but screaming and loud. Denies S/H/I or self-harming behaviors but reports that when he gets very anxious or feels upset, he has negative thoughts about dying but reports he would never harm himself or others.  PCP Mayur Byrnes.

## 2024-01-02 NOTE — DISCUSSION/SUMMARY
[FreeTextEntry1] : Assessment Summary:   Intake date: 4/21/23   Time of intake: 2PM  End of Intake: 3:00PM   Patient signed all consents including HIPPA permission to talk to wife if needed. Patient is slding scale, HH referral declined. Patient speaks German and little english. Patient is okay with using translation services.   ED Tele psych referral on 4/17/23. Patient went to ED for severe anxiety, OCD and depression. Patient is sliding scale. The patient's son attends OPD clinic [facility] and sees Dr Osullivan and Padma.     Hao Maravilla 53 y/o M,  since 1984, 3 adult kids, twin boys 20 y/o M, and a daughter 28 y/o F. Domiciled in a rental apartment with wife and the twin boys. Born [place] raised in [place]. Employed at a 's [employer]. Denies substance use. Reports that over 20 years ago one had one IPP for 1 night due to anxiety and depression. Patient reports that 20 years ago he saw a psychiatrist and therapist and was diagnosed with Anxiety and depression and OCD, stopped 3 years ago when the psychiatrist left from [facility] and commute was unbearable. For the last 3 years Dr Leone prescribed the Zoloft 75 mg. The patient has not received mental health in 3 years and is ready to re-engage. Three weeks ago, the patient started exhibiting severe anxiety and went to ED on 4/16/23. At ED the patient prescribed Sertraline 100 mg 14 day supply and Ativan 1 mg. He reports anxiety symptoms such as waking up in panic, overthinking and having panic attacks. The patient reports depressive symptoms such as depressed mood, anhedonia, intrusive thoughts and crying. His OCD fears are around addiction, and he won't take anything that may cause addiction even prescribed medications, he also gets extremely worried about his health and his anxiety getting worst, "sometimes I feel like I may have tumor" fear about his health in general.  Trauma from father he was an alcoholic, never physical abuse but screaming and loud. Denies S/H/I or self-harming behaviors but reports that when he gets very anxious or feels upset, he has negative thoughts about dying but reports he would never harm himself or others.  PCP Mayur Byrnes.         Assessed Needs- Functional Domain: Anxiety/Depression, OCD     Prioritized Assessed Needs: Anxiety/Depression, OCD      Life goals, strengths, barriers, past successes: "to be sane, to get better, lessen my anxiety".       Recommendation:   [ x]      Medication Only [ ]     Individual therapy only [ ]     Medication and Individual therapy [ ]     Group therapy   Medication management: 1. Continue sertraline 225mg po qam 2. Continue lorazepam 0.5mg- 1mg po BID prn anxiety 3. continue abilify 2mg po qam to augment anxiety/OCD  Follow up in 4-5 weeks  [Date of Last Physical Exam: _____] : Date of Last Physical Exam: [unfilled] [Date of Last Annual Labs: _____] : Date of Last Annual Labs: [unfilled] [Annual Review of Systems Completed?] : Annual Review of Systems Completed: Yes [Tobacco Screening Completed?] : Tobacco Screening Completed: Yes [Date of Last HbgA1c: _____] : Date of Last HbgA1c: [unfilled] [Date of Last Lipid Profile: _____] : Date of Last Lipid Profile: [unfilled] [Potential impact of patient’s physical health conditions on psychiatric care?] : Potential impact of patient's physical health conditions on psychiatric care: No [Does patient require any additional health services or referrals?] : Does patient require any additional health services or referrals: No [Low acute suicide risk] : Low acute suicide risk [No] : No [Not clinically indicated] : Safety Plan completed/updated (for individuals at risk): Not clinically indicated 17-Jun-2022 13:51

## 2024-01-02 NOTE — DISCUSSION/SUMMARY
[Low acute suicide risk] : Low acute suicide risk [No] : No [Not clinically indicated] : Safety Plan completed/updated (for individuals at risk): Not clinically indicated [Initial Plan] : Initial Plan [Able to manage surrounding demands and opportunities] : able to manage surrounding demands and opportunities [Able to exercise self-direction] : able to exercise self-direction [Able to set and pursue goals] : able to set and pursue goals [Adherent to treatment recommendations] : adherent to treatment recommendations [Articulate] : articulate [Attempting to realize their potential] : Attempting to realize their potential [Awareness of substance use issues] : awareness of substance use issues [Cognitively intact] : cognitively intact [Insightful] : insightful [Creative] : creative [Intelligent] : intelligent [Motivated to participate in treatment] : motivated to participate in treatment [Motivated and ready for change] : motivated and ready for change [Has vocational interests or hobbies] : has vocational interests or hobbies [Part of a supportive family] : part of a supportive family [Steady employment] : steady employment [Housing stability] : housing stability [High level of education] : high level of education [English fluency] : English fluency [Initial] : Initial [Mental Health] : Mental Health [Continued - Progress made] : Continued - Progress made: [every ___ weeks] : every [unfilled] weeks [Improvement in symptoms as evidenced by:] : Improvement in symptoms as evidenced by: [Yes] : Yes [Potential impact of patient’s physical health conditions on psychiatric care?] : Potential impact of patient’s physical health conditions on psychiatric care: No [FreeTextEntry2] : 5/1/24 [FreeTextEntry3] : 5/1/23 [de-identified] : on a daily basis [FreeTextEntry1] : depression [FreeTextEntry4] : euthymic mood on a daily basis [FreeTextEntry5] : on a daily basis [de-identified] : symptom free off medication [Annual Review of Systems Completed?] : Annual Review of Systems Completed: No [Tobacco Screening Completed?] : Tobacco Screening Completed: No [Potential impact of patient?s physical health conditions on psychiatric care?] : Potential impact of patient?s physical health conditions on psychiatric care: No [Does patient require any additional health services or referrals?] : Does patient require any additional health services or referrals: No

## 2024-01-02 NOTE — REASON FOR VISIT
[Number can be texted] : number can be texted [OK  to leave message] : OK  to leave message [Other:___] : [unfilled] [[facility] Provider/Facility] : [facility] Provider/Facility [FreeTextEntry2] : [phone] [FreeTextEntry5] : East Timorese [FreeTextEntry6] : Hao [FreeTextEntry7] : He/His [FreeTextEntry1] : Anxiety/Depression/OCD

## 2024-01-02 NOTE — HISTORY OF PRESENT ILLNESS
[Not Applicable] : Not applicable [FreeTextEntry1] : Now in English This is a 53 year old patient who presents for medication management.  The patient reports stable mood, good, sleep and appetite.  The patient denies any symptoms of depression, hailey, or psychosis at this time.  The patient has less anxiety that impairs their daily functioning. The patient denies any suicidal ideation, homicidal ideation, no auditory or visual hallucinations, or paranoid ideation.  The patient has no medical complaints. The patient denies any drug or alcohol use, and is not smoking at this time. I requested the patient's updated physical and labs from their PCP.  Coping skills were discussed and a safety plan was provided.  The patient was educated on the risks, benefits, and alternatives of their psychiatric medications.  The patient will not engage in psychotherapy at this time.  The patient consents to ongoing medication management.  Elects to continue current medication.  [FreeTextEntry2] : 1 IPP 20 years ago for Anxiety. [FreeTextEntry3] : 100 mg Sertraline 1 mg Ativan. risperdal was too strong

## 2024-01-02 NOTE — FAMILY HISTORY
[FreeTextEntry1] : Family composition:Hao Maravilla 53 y/o M,  since 1982, 3 adult kids, twin boys 20 y/o M, and a daughter 28 y/o F. Domiciled in a rental apartment with wife and the twin boys. \par  Family history and background: Born and raised in [place] and [place]. Employed at a 's place.\par  Family relationship:Great.\par  Pertinent Family Medical, MH and Substance Use History including Adult Child of Alcoholic and child of substance abuse status; history of cancer and heart disease:\par  \par  Father: Alcoholic\par  Son: Depression\par

## 2024-01-02 NOTE — PAST MEDICAL HISTORY
[FreeTextEntry1] :  Intake date: 4/21/23  \par  Time of intake: 2PM \par  End of Intake: 3:00PM \par  \par  Patient signed all consents including HIPPA permission to talk to wife if needed. Patient is slding scale, HH referral declined. Patient speaks Tajik and little english. Patient is okay with using translation services. \par  \par  ED Tele psych referral on 4/17/23. Patient went to ED for severe anxiety, OCD and depression. Patient is sliding scale. The patient's son attends [facility] and sees Dr Osullivan and Padma.   \par  \par  Hoa Maravilla 51 y/o M,  since 1982, 3 adult kids, twin boys 20 y/o M, and a daughter 28 y/o F. Domiciled in a rental apartment with wife and the twin boys. Born [place] raised in [place] Employed at a 's place. Denies substance use. Reports that over 20 years ago one had one IPP for 1 night due to anxiety and depression. Patient reports that 20 years ago he saw a psychiatrist and therapist and was diagnosed with Anxiety and depression and OCD, stopped 3 years ago when the psychiatrist left from [facility] and commute was unbearable. For the last 3 years Dr Leone prescribed the Zoloft 75 mg. The patient has not received mental health in 3 years and is ready to re-engage. Three weeks ago, the patient started exhibiting severe anxiety and went to ED on 4/16/23. At ED the patient prescribed Sertraline 100 mg 14 day supply and Ativan 1 mg. He reports anxiety symptoms such as waking up in panic, overthinking and having panic attacks. The patient reports depressive symptoms such as depressed mood, anhedonia, intrusive thoughts and crying. His OCD fears are around addiction, and he won't take anything that may cause addiction even prescribed medications, he also gets extremely worried about his health and his anxiety getting worst, "sometimes I feel like I may have tumor" fear about his health in general.  Trauma from father he was an alcoholic, never physical abuse but screaming and loud. Denies S/H/I or self-harming behaviors but reports that when he gets very anxious or feels upset, he has negative thoughts about dying but reports he would never harm himself or others.  PCP Mayur Byrnes.

## 2024-01-02 NOTE — PLAN
[FreeTextEntry4] : mood is improving  anxiety is less  health is stable  goal to be symptom free, will hold off on therapy [Admit to Program     (Add Program Admission information to a new column in the Admit/Discharge Flowsheet)] : Admit to program

## 2024-01-03 DIAGNOSIS — F41.0 PANIC DISORDER [EPISODIC PAROXYSMAL ANXIETY]: ICD-10-CM

## 2024-01-03 DIAGNOSIS — F41.1 GENERALIZED ANXIETY DISORDER: ICD-10-CM

## 2024-01-03 DIAGNOSIS — F42.9 OBSESSIVE-COMPULSIVE DISORDER, UNSPECIFIED: ICD-10-CM

## 2024-01-04 ENCOUNTER — APPOINTMENT (OUTPATIENT)
Dept: PSYCHIATRY | Facility: CLINIC | Age: 54
End: 2024-01-04

## 2024-02-06 ENCOUNTER — OUTPATIENT (OUTPATIENT)
Dept: OUTPATIENT SERVICES | Facility: HOSPITAL | Age: 54
LOS: 1 days | End: 2024-02-06
Payer: COMMERCIAL

## 2024-02-06 ENCOUNTER — APPOINTMENT (OUTPATIENT)
Dept: PSYCHIATRY | Facility: CLINIC | Age: 54
End: 2024-02-06
Payer: COMMERCIAL

## 2024-02-06 DIAGNOSIS — F33.1 MAJOR DEPRESSIVE DISORDER, RECURRENT, MODERATE: ICD-10-CM

## 2024-02-06 DIAGNOSIS — F41.1 GENERALIZED ANXIETY DISORDER: ICD-10-CM

## 2024-02-06 DIAGNOSIS — F41.0 PANIC DISORDER [EPISODIC PAROXYSMAL ANXIETY]: ICD-10-CM

## 2024-02-06 DIAGNOSIS — F42.9 OBSESSIVE-COMPULSIVE DISORDER, UNSPECIFIED: ICD-10-CM

## 2024-02-06 PROCEDURE — 99214 OFFICE O/P EST MOD 30 MIN: CPT

## 2024-02-06 RX ORDER — LORAZEPAM 1 MG/1
1 TABLET ORAL TWICE DAILY
Qty: 60 | Refills: 0 | Status: ACTIVE | COMMUNITY
Start: 2023-05-01 | End: 1900-01-01

## 2024-02-06 NOTE — PAST MEDICAL HISTORY
[FreeTextEntry1] :  Intake date: 4/21/23  \par  Time of intake: 2PM \par  End of Intake: 3:00PM \par  \par  Patient signed all consents including HIPPA permission to talk to wife if needed. Patient is slding scale, HH referral declined. Patient speaks Wallisian and little english. Patient is okay with using translation services. \par  \par  ED Tele psych referral on 4/17/23. Patient went to ED for severe anxiety, OCD and depression. Patient is sliding scale. The patient's son attends OPD clinic [facility] and sees Dr Osullivan and Padma.   \par  \par  Hao Maravilla 51 y/o M,  since 1984, 3 adult kids, twin boys 20 y/o M, and a daughter 26 y/o F. Domiciled in a rental apartment with wife and the twin boys. Born [place] raised in [place]. Employed at a 's place. Denies substance use. Reports that over 20 years ago one had one IPP for 1 night due to anxiety and depression. Patient reports that 20 years ago he saw a psychiatrist and therapist and was diagnosed with Anxiety and depression and OCD, stopped 3 years ago when the psychiatrist left from [facility] and commute was unbearable. For the last 3 years Dr Leone prescribed the Zoloft 75 mg. The patient has not received mental health in 3 years and is ready to re-engage. Three weeks ago, the patient started exhibiting severe anxiety and went to ED on 4/16/23. At ED the patient prescribed Sertraline 100 mg 14 day supply and Ativan 1 mg. He reports anxiety symptoms such as waking up in panic, overthinking and having panic attacks. The patient reports depressive symptoms such as depressed mood, anhedonia, intrusive thoughts and crying. His OCD fears are around addiction, and he won't take anything that may cause addiction even prescribed medications, he also gets extremely worried about his health and his anxiety getting worst, "sometimes I feel like I may have tumor" fear about his health in general.  Trauma from father he was an alcoholic, never physical abuse but screaming and loud. Denies S/H/I or self-harming behaviors but reports that when he gets very anxious or feels upset, he has negative thoughts about dying but reports he would never harm himself or others.  PCP Mayur Byrnes.

## 2024-02-06 NOTE — REASON FOR VISIT
[Number can be texted] : number can be texted [OK  to leave message] : OK  to leave message [Other:___] : [unfilled] [[facility] Provider/Facility] : [facility] Provider/Facility [FreeTextEntry2] : [phone] [FreeTextEntry5] : Scottish [FreeTextEntry6] : Hao [FreeTextEntry7] : He/His [FreeTextEntry1] : Anxiety/Depression/OCD

## 2024-02-06 NOTE — DISCUSSION/SUMMARY
[Date of Last Physical Exam: _____] : Date of Last Physical Exam: [unfilled] [Date of Last Annual Labs: _____] : Date of Last Annual Labs: [unfilled] [Annual Review of Systems Completed?] : Annual Review of Systems Completed: Yes [Tobacco Screening Completed?] : Tobacco Screening Completed: Yes [Date of Last HbgA1c: _____] : Date of Last HbgA1c: [unfilled] [Date of Last Lipid Profile: _____] : Date of Last Lipid Profile: [unfilled] [Low acute suicide risk] : Low acute suicide risk [No] : No [Not clinically indicated] : Safety Plan completed/updated (for individuals at risk): Not clinically indicated [FreeTextEntry1] : Assessment Summary:   Intake date: 4/21/23   Time of intake: 2PM  End of Intake: 3:00PM   Patient signed all consents including HIPPA permission to talk to wife if needed. Patient is slding scale, HH referral declined. Patient speaks Wolof and little english. Patient is okay with using translation services.   ED Tele psych referral on 4/17/23. Patient went to ED for severe anxiety, OCD and depression. Patient is sliding scale. The patient's son attends [facility] and sees Dr Osullivan and Padma.     Hao Maravilla 53 y/o M,  since 1982, 3 adult kids, twin boys 20 y/o M, and a daughter 26 y/o F. Domiciled in a rental apartment with wife and the twin boys. Born and raised in [place] and [place]. Employed at a [employer]s [employer]. Denies substance use. Reports that over 20 years ago one had one IPP for 1 night due to anxiety and depression. Patient reports that 20 years ago he saw a psychiatrist and therapist and was diagnosed with Anxiety and depression and OCD, stopped 3 years ago when the psychiatrist left from [facility] and commute was unbearable. For the last 3 years Dr Leone prescribed the Zoloft 75 mg. The patient has not received mental health in 3 years and is ready to re-engage. Three weeks ago, the patient started exhibiting severe anxiety and went to ED on 4/16/23. At ED the patient prescribed Sertraline 100 mg 14 day supply and Ativan 1 mg. He reports anxiety symptoms such as waking up in panic, overthinking and having panic attacks. The patient reports depressive symptoms such as depressed mood, anhedonia, intrusive thoughts and crying. His OCD fears are around addiction, and he won't take anything that may cause addiction even prescribed medications, he also gets extremely worried about his health and his anxiety getting worst, "sometimes I feel like I may have tumor" fear about his health in general.  Trauma from father he was an alcoholic, never physical abuse but screaming and loud. Denies S/H/I or self-harming behaviors but reports that when he gets very anxious or feels upset, he has negative thoughts about dying but reports he would never harm himself or others.  PCP Mayur Byrnes.         Assessed Needs- Functional Domain: Anxiety/Depression, OCD     Prioritized Assessed Needs: Anxiety/Depression, OCD      Life goals, strengths, barriers, past successes: "to be sane, to get better, lessen my anxiety".       Recommendation:   [ x]      Medication Only [ ]     Individual therapy only [ ]     Medication and Individual therapy [ ]     Group therapy   Medication management: 1. Continue sertraline 225mg po qam 2. Continue lorazepam 0.5mg- 1mg po BID prn anxiety 3. continue abilify 2mg po qam to augment anxiety/OCD  Follow up in 8 weeks  [Potential impact of patientâ??s physical health conditions on psychiatric care?] : Potential impact of patient's physical health conditions on psychiatric care: No [Does patient require any additional health services or referrals?] : Does patient require any additional health services or referrals: No

## 2024-02-07 DIAGNOSIS — F41.1 GENERALIZED ANXIETY DISORDER: ICD-10-CM

## 2024-02-07 DIAGNOSIS — F42.9 OBSESSIVE-COMPULSIVE DISORDER, UNSPECIFIED: ICD-10-CM

## 2024-02-07 DIAGNOSIS — F41.0 PANIC DISORDER [EPISODIC PAROXYSMAL ANXIETY]: ICD-10-CM

## 2024-03-04 ENCOUNTER — APPOINTMENT (OUTPATIENT)
Age: 54
End: 2024-03-04

## 2024-04-02 ENCOUNTER — APPOINTMENT (OUTPATIENT)
Dept: PSYCHIATRY | Facility: CLINIC | Age: 54
End: 2024-04-02
Payer: COMMERCIAL

## 2024-04-02 ENCOUNTER — OUTPATIENT (OUTPATIENT)
Dept: OUTPATIENT SERVICES | Facility: HOSPITAL | Age: 54
LOS: 1 days | End: 2024-04-02
Payer: COMMERCIAL

## 2024-04-02 DIAGNOSIS — F42.9 OBSESSIVE-COMPULSIVE DISORDER, UNSPECIFIED: ICD-10-CM

## 2024-04-02 DIAGNOSIS — F41.1 GENERALIZED ANXIETY DISORDER: ICD-10-CM

## 2024-04-02 DIAGNOSIS — F41.0 PANIC DISORDER [EPISODIC PAROXYSMAL ANXIETY]: ICD-10-CM

## 2024-04-02 PROCEDURE — 99214 OFFICE O/P EST MOD 30 MIN: CPT

## 2024-04-02 NOTE — SOCIAL HISTORY
[FreeTextEntry1] : Employment history: Employed at [employer] .\par  Developmental history: Denies.\par  Sexual hx/identity Sexual History/ Concern (include sexual orientation and other relevant information) : Heterosexual.\par  Race - ethnicity - culture information: Namibian.\par  Social supports (friends, Volunteers, club, AA meeting, other meetings ) ? Friends.\par  Meaningful Activities: Walking, Jogging, \par  \par  \par  Spiritual Assessment Tool - FICA\par  F. What is your odin or belief? Presybeterian, but doesn't practice\par  Do you consider yourself spiritual or Christian? none\par  Is there something you believe in that gives meaning to your life? N/A\par  I: Is it important in your life? N/A\par  What influence does it have on how you take care of yourself? N/A\par  How have your beliefs influenced your behavior during this illness? What role do your beliefs play in regaining your health? n/A\par  C. Are you part of a spiritual or Christian community? no\par  Is this of support to you and how? N/A\par  Is there a person or group of people you really love or who are really important to you? "my wife, my kids"\par  H. We have been discussing your belief and supports. What else gives you internal support? "Hope that i will get better"\par  What are your sources of hope, strength, comfort and peace? "my family"\par  What do you hold on to during difficult times? "my family"\par  what sustains you and keeps you going?" my family"\par  A. How would you like me, your healthcare provider, to address these issues in your healthcare? " to help me manage my anxiety"\par  \par   \par  \par  \par  \par   \par  \par  \par

## 2024-04-02 NOTE — PSYCHOSOCIAL ASSESSMENT
[None known] : None known [HS Diploma or GED] : HS Diploma or GED [Yes (select details below)] : Have you ever experienced this type of event? Yes [Competitive and integrated employment] : Competitive and integrated employment [Earned income] : earned income [35 hours or more] : 35 hours or more [None] : none [Private residence (home, apartment, rooming house, hotel, motel, supported housing, supported Single Room Occupancy (SRO),] : Private residence (home, apartment, rooming house, hotel, motel, supported housing, supported Single Room Occupancy (SRO), permanent housing programs, transient housing programs, and shelter plus care housing) [Client's spouse or domestic partner] : client's spouse or domestic partner [No] : Patient has personal representation (legal guardian, representative payee, conservatorship)? No [FreeTextEntry4] : Client lives with his wife and twin boys [FreeTextEntry7] : Aissatou teresa wife

## 2024-04-02 NOTE — REASON FOR VISIT
[Number can be texted] : number can be texted [OK  to leave message] : OK  to leave message [Other:___] : [unfilled] [[facility] Provider/Facility] : [facility] Provider/Facility [FreeTextEntry2] : [phone] [FreeTextEntry5] : [place] [FreeTextEntry6] : Hao [FreeTextEntry7] : He/His [FreeTextEntry1] : Anxiety/Depression/OCD

## 2024-04-02 NOTE — PHYSICAL EXAM
[None] : none [Euthymic] : euthymic [Cooperative] : cooperative [Clear] : clear [Full] : full [Linear/Goal Directed] : linear/goal directed [Average] : average [WNL] : within normal limits

## 2024-04-02 NOTE — REASON FOR VISIT
[Number can be texted] : number can be texted [OK  to leave message] : OK  to leave message [Other:___] : [unfilled] [[facility] Provider/Facility] : [facility] Provider[facility]ility [FreeTextEntry2] : [phone] [FreeTextEntry5] : Vietnamese [FreeTextEntry6] : Hao [FreeTextEntry1] : Anxiety/Depression/OCD [FreeTextEntry7] : He/His

## 2024-04-02 NOTE — PSYCHOSOCIAL ASSESSMENT
[None known] : None known [HS Diploma or GED] : HS Diploma or GED [Yes (select details below)] : Have you ever experienced this type of event? Yes [Competitive and integrated employment] : Competitive and integrated employment [Earned income] : earned income [35 hours or more] : 35 hours or more [None] : none [Client's spouse or domestic partner] : client's spouse or domestic partner [Private residence (home, apartment, rooming house, hotel, motel, supported housing, supported Single Room Occupancy (SRO),] : Private residence (home, apartment, rooming house, hotel, motel, supported housing, supported Single Room Occupancy (SRO), permanent housing programs, transient housing programs, and shelter plus care housing) [No] : Patient has personal representation (legal guardian, representative payee, conservatorship)? No [FreeTextEntry7] : Aissatou teresa wife [FreeTextEntry4] : Client lives with his wife and twin boys

## 2024-04-02 NOTE — DISCUSSION/SUMMARY
[Date of Last Physical Exam: _____] : Date of Last Physical Exam: [unfilled] [Date of Last Annual Labs: _____] : Date of Last Annual Labs: [unfilled] [Annual Review of Systems Completed?] : Annual Review of Systems Completed: Yes [Tobacco Screening Completed?] : Tobacco Screening Completed: Yes [Date of Last Lipid Profile: _____] : Date of Last Lipid Profile: [unfilled] [Date of Last HbgA1c: _____] : Date of Last HbgA1c: [unfilled] [Low acute suicide risk] : Low acute suicide risk [Not clinically indicated] : Safety Plan completed/updated (for individuals at risk): Not clinically indicated [FreeTextEntry1] : Assessment Summary:   Intake date: 4/21/23   Time of intake: 2PM  End of Intake: 3:00PM   Patient signed all consents including HIPPA permission to talk to wife if needed. Patient is slding scale, HH referral declined. Patient speaks Estonian and little english. Patient is okay with using translation services.   ED Tele psych referral on 4/17/23. Patient went to ED for severe anxiety, OCD and depression. Patient is sliding scale. The patient's son attends [facility] and sees Dr Osullivan and Padma.     Hao Maravilla 53 y/o M,  since 1982, 3 adult kids, twin boys 20 y/o M, and a daughter 28 y/o F. Domiciled in a rental apartment with wife and the twin boys. Born [place] raised in [place]. Employed at a [employer]. Denies substance use. Reports that over 20 years ago one had one IPP for 1 night due to anxiety and depression. Patient reports that 20 years ago he saw a psychiatrist and therapist and was diagnosed with Anxiety and depression and OCD, stopped 3 years ago when the psychiatrist left from [facility] and commute was unbearable. For the last 3 years Dr Leone prescribed the Zoloft 75 mg. The patient has not received mental health in 3 years and is ready to re-engage. Three weeks ago, the patient started exhibiting severe anxiety and went to ED on 4/16/23. At ED the patient prescribed Sertraline 100 mg 14 day supply and Ativan 1 mg. He reports anxiety symptoms such as waking up in panic, overthinking and having panic attacks. The patient reports depressive symptoms such as depressed mood, anhedonia, intrusive thoughts and crying. His OCD fears are around addiction, and he won't take anything that may cause addiction even prescribed medications, he also gets extremely worried about his health and his anxiety getting worst, "sometimes I feel like I may have tumor" fear about his health in general.  Trauma from father he was an alcoholic, never physical abuse but screaming and loud. Denies S/H/I or self-harming behaviors but reports that when he gets very anxious or feels upset, he has negative thoughts about dying but reports he would never harm himself or others.  PCP Mayur Byrnes.         Assessed Needs- Functional Domain: Anxiety/Depression, OCD     Prioritized Assessed Needs: Anxiety/Depression, OCD      Life goals, strengths, barriers, past successes: "to be sane, to get better, lessen my anxiety".       Recommendation:   [ x]      Medication Only [ ]     Individual therapy only [ ]     Medication and Individual therapy [ ]     Group therapy   Medication management: 1. Continue sertraline 225mg po qam 2. Continue lorazepam 0.5mg- 1mg po BID prn anxiety 3. continue abilify 2mg po qam to augment anxiety/OCD  Follow up in 8 weeks  [Does patient require any additional health services or referrals?] : Does patient require any additional health services or referrals: No [Potential impact of patientâ??s physical health conditions on psychiatric care?] : Potential impact of patient's physical health conditions on psychiatric care: No [Denied] : Denied [No] : No [Not indicated at this time] : Not indicated at this time [Advised to schedule] : Advised to schedule [Yes] : Yes [Does patient use medical marijuana?] : Patient does not use medical marijuana. [Does patient use tobacco products?] : Patient does not use tobacco products [Patient has been tested for HIV?] : Patient has not been tested for HIV [Patient has been tested for Hepatitis?] : Patient has not been tested for hepatitis [Patient would like to be tested/re-tested?] : patient would not like to be tested/re-tested [Current or past COVID-19 diagnosis?] : Patient had a present or past COVID-19 diagnosis [Education provided about COVID-19?] : Education provided about COVID-19 [Vaccinated?] : is vaccinated

## 2024-04-02 NOTE — RISK ASSESSMENT
[Low acute suicide risk] : Low acute suicide risk [No, patient denies ideation or behavior] : No, patient denies ideation or behavior [Clinical Interview] : Clinical Interview [Yes] : 1. Passive Ideation: Have you wished you were dead or wished you could go to sleep and not wake up? Yes [Depressed mood/Anhedonia] : depressed mood/anhedonia [Severe anxiety, agitation or panic] : severe anxiety, agitation or panic [None known] : None known [Identifies reasons for living] : identifies reasons for living [Supportive social network of family or friends] : supportive social network of family or friends [Responsibility to children, family, or others] : responsibility to children, family, or others [Fear of death/actual act of killing self] : fear of death or the actual act of killing self [Engaged in work or school] : engaged in work or school [None in the patient's lifetime] : None in the patient's lifetime [Residential stability] : residential stability [No known risk factors] : No known risk factors [Employment stability] : employment stability [Relationship stability] : relationship stability [Affective stability] : affective stability [No] : no

## 2024-04-02 NOTE — RISK ASSESSMENT
[No, patient denies ideation or behavior] : No, patient denies ideation or behavior [Low acute suicide risk] : Low acute suicide risk [Clinical Interview] : Clinical Interview [Yes] : 1. Passive Ideation: Have you wished you were dead or wished you could go to sleep and not wake up? Yes [Depressed mood/Anhedonia] : depressed mood/anhedonia [Severe anxiety, agitation or panic] : severe anxiety, agitation or panic [None known] : None known [Identifies reasons for living] : identifies reasons for living [Supportive social network of family or friends] : supportive social network of family or friends [Responsibility to children, family, or others] : responsibility to children, family, or others [Fear of death/actual act of killing self] : fear of death or the actual act of killing self [Engaged in work or school] : engaged in work or school [None in the patient's lifetime] : None in the patient's lifetime [No known risk factors] : No known risk factors [Residential stability] : residential stability [Employment stability] : employment stability [Relationship stability] : relationship stability [No] : no [Affective stability] : affective stability

## 2024-04-02 NOTE — PAST MEDICAL HISTORY
[FreeTextEntry1] :  Intake date: 4/21/23  \par  Time of intake: 2PM \par  End of Intake: 3:00PM \par  \par  Patient signed all consents including HIPPA permission to talk to wife if needed. Patient is slding scale, HH referral declined. Patient speaks Tajik and little english. Patient is okay with using translation services. \par  \par  ED Tele psych referral on 4/17/23. Patient went to ED for severe anxiety, OCD and depression. Patient is sliding scale. The patient's son attends [facility] and sees Dr Osullivan and Padma.   \par  \par  Hao Maravilla 53 y/o M,  since 1982, 3 adult kids, twin boys 18 y/o M, and a daughter 26 y/o F. Domiciled in a rental apartment with wife and the twin boys. Born [place] raised in [place]. Employed at a [employer]. Denies substance use. Reports that over 20 years ago one had one IPP for 1 night due to anxiety and depression. Patient reports that 20 years ago he saw a psychiatrist and therapist and was diagnosed with Anxiety and depression and OCD, stopped 3 years ago when the psychiatrist left from [facility] and commute was unbearable. For the last 3 years Dr Leone prescribed the Zoloft 75 mg. The patient has not received mental health in 3 years and is ready to re-engage. Three weeks ago, the patient started exhibiting severe anxiety and went to ED on 4/16/23. At ED the patient prescribed Sertraline 100 mg 14 day supply and Ativan 1 mg. He reports anxiety symptoms such as waking up in panic, overthinking and having panic attacks. The patient reports depressive symptoms such as depressed mood, anhedonia, intrusive thoughts and crying. His OCD fears are around addiction, and he won't take anything that may cause addiction even prescribed medications, he also gets extremely worried about his health and his anxiety getting worst, "sometimes I feel like I may have tumor" fear about his health in general.  Trauma from father he was an alcoholic, never physical abuse but screaming and loud. Denies S/H/I or self-harming behaviors but reports that when he gets very anxious or feels upset, he has negative thoughts about dying but reports he would never harm himself or others.  PCP Mayur Byrnes.

## 2024-04-02 NOTE — HEALTH RISK ASSESSMENT
[Patient/Caregiver not ready to engage] : , patient/caregiver not ready to engage [Patient/Collateral not ready to engage] : , patient/collateral not ready to engage [AdvancecareDate] : 4/21/23 Rhomboid Transposition Flap Text: The defect edges were debeveled with a #15 scalpel blade.  Given the location of the defect and the proximity to free margins a rhomboid transposition flap was deemed most appropriate.  Using a sterile surgical marker, an appropriate rhomboid flap was drawn incorporating the defect.    The area thus outlined was incised deep to adipose tissue with a #15 scalpel blade.  The skin margins were undermined to an appropriate distance in all directions utilizing iris scissors.

## 2024-04-02 NOTE — SOCIAL HISTORY
[FreeTextEntry1] : Employment history: Employed at [employer] .\par  Developmental history: Denies.\par  Sexual hx/identity Sexual History/ Concern (include sexual orientation and other relevant information) : Heterosexual.\par  Race - ethnicity - culture information: [place]ican.\par  Social supports (friends, Volunteers, club, AA meeting, other meetings ) ? Friends.\par  Meaningful Activities: Walking, Jogging, \par  \par  \par  Spiritual Assessment Tool - FICA\par  F. What is your odin or belief? Adventism, but doesn't practice\par  Do you consider yourself spiritual or Latter-day? none\par  Is there something you believe in that gives meaning to your life? N/A\par  I: Is it important in your life? N/A\par  What influence does it have on how you take care of yourself? N/A\par  How have your beliefs influenced your behavior during this illness? What role do your beliefs play in regaining your health? n/A\par  C. Are you part of a spiritual or Latter-day community? no\par  Is this of support to you and how? N/A\par  Is there a person or group of people you really love or who are really important to you? "my wife, my kids"\par  H. We have been discussing your belief and supports. What else gives you internal support? "Hope that i will get better"\par  What are your sources of hope, strength, comfort and peace? "my family"\par  What do you hold on to during difficult times? "my family"\par  what sustains you and keeps you going?" my family"\par  A. How would you like me, your healthcare provider, to address these issues in your healthcare? " to help me manage my anxiety"\par  \par   \par  \par  \par  \par   \par  \par  \par

## 2024-04-02 NOTE — DISCUSSION/SUMMARY
[Date of Last Physical Exam: _____] : Date of Last Physical Exam: [unfilled] [Date of Last Annual Labs: _____] : Date of Last Annual Labs: [unfilled] [Annual Review of Systems Completed?] : Annual Review of Systems Completed: Yes [Tobacco Screening Completed?] : Tobacco Screening Completed: Yes [Date of Last Lipid Profile: _____] : Date of Last Lipid Profile: [unfilled] [Date of Last HbgA1c: _____] : Date of Last HbgA1c: [unfilled] [Low acute suicide risk] : Low acute suicide risk [Not clinically indicated] : Safety Plan completed/updated (for individuals at risk): Not clinically indicated [FreeTextEntry1] : Assessment Summary:   Intake date: 4/21/23   Time of intake: 2PM  End of Intake: 3:00PM   Patient signed all consents including HIPPA permission to talk to wife if needed. Patient is slding scale, HH referral declined. Patient speaks Swedish and little english. Patient is okay with using translation services.   ED Tele psych referral on 4/17/23. Patient went to ED for severe anxiety, OCD and depression. Patient is sliding scale. The patient's son attends [facility] and sees Dr Osulliavn and Padma.     Hao Maravilla 53 y/o M,  since 1984, 3 adult kids, twin boys 20 y/o M, and a daughter 28 y/o F. Domiciled in a rental apartment with wife and the twin boys. Born [place] raised in [place]. Employed at a 's place. Denies substance use. Reports that over 20 years ago one had one IPP for 1 night due to anxiety and depression. Patient reports that 20 years ago he saw a psychiatrist and therapist and was diagnosed with Anxiety and depression and OCD, stopped 3 years ago when the psychiatrist left from [facility] and commute was unbearable. For the last 3 years Dr eLone prescribed the Zoloft 75 mg. The patient has not received mental health in 3 years and is ready to re-engage. Three weeks ago, the patient started exhibiting severe anxiety and went to ED on 4/16/23. At ED the patient prescribed Sertraline 100 mg 14 day supply and Ativan 1 mg. He reports anxiety symptoms such as waking up in panic, overthinking and having panic attacks. The patient reports depressive symptoms such as depressed mood, anhedonia, intrusive thoughts and crying. His OCD fears are around addiction, and he won't take anything that may cause addiction even prescribed medications, he also gets extremely worried about his health and his anxiety getting worst, "sometimes I feel like I may have tumor" fear about his health in general.  Trauma from father he was an alcoholic, never physical abuse but screaming and loud. Denies S/H/I or self-harming behaviors but reports that when he gets very anxious or feels upset, he has negative thoughts about dying but reports he would never harm himself or others.  PCP Mayur Byrnes.         Assessed Needs- Functional Domain: Anxiety/Depression, OCD     Prioritized Assessed Needs: Anxiety/Depression, OCD      Life goals, strengths, barriers, past successes: "to be sane, to get better, lessen my anxiety".       Recommendation:   [ x]      Medication Only [ ]     Individual therapy only [ ]     Medication and Individual therapy [ ]     Group therapy   Medication management: 1. Continue sertraline 225mg po qam 2. Continue lorazepam 0.5mg- 1mg po BID prn anxiety 3. continue abilify 2mg po qam to augment anxiety/OCD  Follow up in 8 weeks  [Potential impact of patientâ??s physical health conditions on psychiatric care?] : Potential impact of patient's physical health conditions on psychiatric care: No [Does patient require any additional health services or referrals?] : Does patient require any additional health services or referrals: No [Denied] : Denied [No] : No [Not indicated at this time] : Not indicated at this time [Advised to schedule] : Advised to schedule [Yes] : Yes [Does patient use medical marijuana?] : Patient does not use medical marijuana. [Does patient use tobacco products?] : Patient does not use tobacco products [Patient has been tested for HIV?] : Patient has not been tested for HIV [Patient has been tested for Hepatitis?] : Patient has not been tested for hepatitis [Current or past COVID-19 diagnosis?] : Patient had a present or past COVID-19 diagnosis [Patient would like to be tested/re-tested?] : patient would not like to be tested/re-tested [Education provided about COVID-19?] : Education provided about COVID-19 [Vaccinated?] : is vaccinated

## 2024-04-02 NOTE — HISTORY OF PRESENT ILLNESS
[Not Applicable] : Not applicable [FreeTextEntry2] : 1 IPP 20 years ago for Anxiety. [FreeTextEntry1] : Now in English This is a 53 year old patient who presents for medication management.  The patient reports stable mood, good, sleep and appetite.  The patient denies any symptoms of depression, hailey, or psychosis at this time.  The patient has less anxiety that impairs their daily functioning. The patient denies any suicidal ideation, homicidal ideation, no auditory or visual hallucinations, or paranoid ideation.  The patient has no medical complaints. The patient denies any drug or alcohol use, and is not smoking at this time. I requested the patient's updated physical and labs from their PCP.  Coping skills were discussed and a safety plan was provided.  The patient was educated on the risks, benefits, and alternatives of their psychiatric medications.  The patient will not engage in psychotherapy at this time.  The patient consents to ongoing medication management.  Elects to continue current medication.  [FreeTextEntry3] : 100 mg Sertraline 1 mg Ativan. risperdal was too strong

## 2024-04-02 NOTE — PAST MEDICAL HISTORY
[FreeTextEntry1] :  Intake date: 4/21/23  \par  Time of intake: 2PM \par  End of Intake: 3:00PM \par  \par  Patient signed all consents including HIPPA permission to talk to wife if needed. Patient is slding scale, HH referral declined. Patient speaks Citizen of [place] and little english. Patient is okay with using translation services. \par  \par  ED Tele psych referral on 4/17/23. Patient went to ED for severe anxiety, OCD and depression. Patient is sliding scale. The patient's son attends OPD clinic [facility] and sees Dr Osullivan and Padma.   \par  \par  Hao Maravilla 53 y/o M,  since 1984, 3 adult kids, twin boys 20 y/o M, and a daughter 26 y/o F. Domiciled in a rental apartment with wife and the twin boys. Born and raised in [place]. Employed at a 's place. Denies substance use. Reports that over 20 years ago one had one IPP for 1 night due to anxiety and depression. Patient reports that 20 years ago he saw a psychiatrist and therapist and was diagnosed with Anxiety and depression and OCD, stopped 3 years ago when the psychiatrist left from [facility] and commute was unbearable. For the last 3 years Dr Leone prescribed the Zoloft 75 mg. The patient has not received mental health in 3 years and is ready to re-engage. Three weeks ago, the patient started exhibiting severe anxiety and went to ED on 4/16/23. At ED the patient prescribed Sertraline 100 mg 14 day supply and Ativan 1 mg. He reports anxiety symptoms such as waking up in panic, overthinking and having panic attacks. The patient reports depressive symptoms such as depressed mood, anhedonia, intrusive thoughts and crying. His OCD fears are around addiction, and he won't take anything that may cause addiction even prescribed medications, he also gets extremely worried about his health and his anxiety getting worst, "sometimes I feel like I may have tumor" fear about his health in general.  Trauma from father he was an alcoholic, never physical abuse but screaming and loud. Denies S/H/I or self-harming behaviors but reports that when he gets very anxious or feels upset, he has negative thoughts about dying but reports he would never harm himself or others.  PCP Mayur Byrnes.

## 2024-04-03 DIAGNOSIS — F41.1 GENERALIZED ANXIETY DISORDER: ICD-10-CM

## 2024-04-03 DIAGNOSIS — F42.9 OBSESSIVE-COMPULSIVE DISORDER, UNSPECIFIED: ICD-10-CM

## 2024-04-03 DIAGNOSIS — F41.0 PANIC DISORDER [EPISODIC PAROXYSMAL ANXIETY]: ICD-10-CM

## 2024-05-28 ENCOUNTER — OUTPATIENT (OUTPATIENT)
Dept: OUTPATIENT SERVICES | Facility: HOSPITAL | Age: 54
LOS: 1 days | End: 2024-05-28
Payer: COMMERCIAL

## 2024-05-28 ENCOUNTER — APPOINTMENT (OUTPATIENT)
Dept: PSYCHIATRY | Facility: CLINIC | Age: 54
End: 2024-05-28
Payer: COMMERCIAL

## 2024-05-28 DIAGNOSIS — F41.0 PANIC DISORDER [EPISODIC PAROXYSMAL ANXIETY]: ICD-10-CM

## 2024-05-28 DIAGNOSIS — F41.1 GENERALIZED ANXIETY DISORDER: ICD-10-CM

## 2024-05-28 DIAGNOSIS — F42.9 OBSESSIVE-COMPULSIVE DISORDER, UNSPECIFIED: ICD-10-CM

## 2024-05-28 PROCEDURE — 99214 OFFICE O/P EST MOD 30 MIN: CPT | Mod: 95

## 2024-05-28 NOTE — SOCIAL HISTORY
[FreeTextEntry1] : Employment history: Employed at [employer] .\par  Developmental history: Denies.\par  Sexual hx/identity Sexual History/ Concern (include sexual orientation and other relevant information) : Heterosexual.\par  Race - ethnicity - culture information: Kosovan.\par  Social supports (friends, Volunteers, club, AA meeting, other meetings ) ? Friends.\par  Meaningful Activities: Walking, Jogging, \par  \par  \par  Spiritual Assessment Tool - FICA\par  F. What is your odni or belief? Latter day, but doesn't practice\par  Do you consider yourself spiritual or Anabaptism? none\par  Is there something you believe in that gives meaning to your life? N/A\par  I: Is it important in your life? N/A\par  What influence does it have on how you take care of yourself? N/A\par  How have your beliefs influenced your behavior during this illness? What role do your beliefs play in regaining your health? n/A\par  C. Are you part of a spiritual or Anabaptism community? no\par  Is this of support to you and how? N/A\par  Is there a person or group of people you really love or who are really important to you? "my wife, my kids"\par  H. We have been discussing your belief and supports. What else gives you internal support? "Hope that i will get better"\par  What are your sources of hope, strength, comfort and peace? "my family"\par  What do you hold on to during difficult times? "my family"\par  what sustains you and keeps you going?" my family"\par  A. How would you like me, your healthcare provider, to address these issues in your healthcare? " to help me manage my anxiety"\par  \par   \par  \par  \par  \par   \par  \par  \par

## 2024-05-28 NOTE — PAST MEDICAL HISTORY
[FreeTextEntry1] :  Intake date: 4/21/23  \par  Time of intake: 2PM \par  End of Intake: 3:00PM \par  \par  Patient signed all consents including HIPPA permission to talk to wife if needed. Patient is slding scale, HH referral declined. Patient speaks Slovak and little english. Patient is okay with using translation services. \par  \par  ED Tele psych referral on 4/17/23. Patient went to ED for severe anxiety, OCD and depression. Patient is sliding scale. The patient's son attends [facility] clinic [facility] and sees Dr Osullivan and Padma.   \par  \par  Hao Maravilla 53 y/o M,  since 1984, 3 adult kids, twin boys 20 y/o M, and a daughter 26 y/o F. Domiciled in a rental apartment with wife and the twin boys. Born [place] raised in [place]. Employed at a 's place. Denies substance use. Reports that over 20 years ago one had one IPP for 1 night due to anxiety and depression. Patient reports that 20 years ago he saw a psychiatrist and therapist and was diagnosed with Anxiety and depression and OCD, stopped 3 years ago when the psychiatrist left from [facility] and commute was unbearable. For the last 3 years Dr Leone prescribed the Zoloft 75 mg. The patient has not received mental health in 3 years and is ready to re-engage. Three weeks ago, the patient started exhibiting severe anxiety and went to ED on 4/16/23. At ED the patient prescribed Sertraline 100 mg 14 day supply and Ativan 1 mg. He reports anxiety symptoms such as waking up in panic, overthinking and having panic attacks. The patient reports depressive symptoms such as depressed mood, anhedonia, intrusive thoughts and crying. His OCD fears are around addiction, and he won't take anything that may cause addiction even prescribed medications, he also gets extremely worried about his health and his anxiety getting worst, "sometimes I feel like I may have tumor" fear about his health in general.  Trauma from father he was an alcoholic, never physical abuse but screaming and loud. Denies S/H/I or self-harming behaviors but reports that when he gets very anxious or feels upset, he has negative thoughts about dying but reports he would never harm himself or others.  PCP Mayur Byrnes.

## 2024-05-28 NOTE — DISCUSSION/SUMMARY
[Date of Last Physical Exam: _____] : Date of Last Physical Exam: [unfilled] [Date of Last Annual Labs: _____] : Date of Last Annual Labs: [unfilled] [Annual Review of Systems Completed?] : Annual Review of Systems Completed: Yes [Tobacco Screening Completed?] : Tobacco Screening Completed: Yes [Date of Last HbgA1c: _____] : Date of Last HbgA1c: [unfilled] [Date of Last Lipid Profile: _____] : Date of Last Lipid Profile: [unfilled] [Low acute suicide risk] : Low acute suicide risk [No] : No [Not clinically indicated] : Safety Plan completed/updated (for individuals at risk): Not clinically indicated [FreeTextEntry1] :   Patient signed all consents including HIPPA permission to talk to wife if needed. Patient is slding scale, HH referral declined. Patient speaks Macedonian and little english. Patient is okay with using translation services.   ED Tele psych referral on 4/17/23. Patient went to ED for severe anxiety, OCD and depression. Patient is sliding scale. The patient's son attends O[facility] clinic [facility] and sees Dr Osullivan and Padma.     Hao Maravilla 53 y/o M,  since 1984, 3 adult kids, twin boys 20 y/o M, and a daughter 28 y/o F. Domiciled in a rental apartment with wife and the twin boys. Born [place] raised in [place]. Employed at a 's place. Denies substance use. Reports that over 20 years ago one had one IPP for 1 night due to anxiety and depression. Patient reports that 20 years ago he saw a psychiatrist and therapist and was diagnosed with Anxiety and depression and OCD, stopped 3 years ago when the psychiatrist left from [facility] and commute was unbearable. For the last 3 years Dr Leone prescribed the Zoloft 75 mg. The patient has not received mental health in 3 years and is ready to re-engage. Three weeks ago, the patient started exhibiting severe anxiety and went to ED on 4/16/23. At ED the patient prescribed Sertraline 100 mg 14 day supply and Ativan 1 mg. He reports anxiety symptoms such as waking up in panic, overthinking and having panic attacks. The patient reports depressive symptoms such as depressed mood, anhedonia, intrusive thoughts and crying. His OCD fears are around addiction, and he won't take anything that may cause addiction even prescribed medications, he also gets extremely worried about his health and his anxiety getting worst, "sometimes I feel like I may have tumor" fear about his health in general.  Trauma from father he was an alcoholic, never physical abuse but screaming and loud. Denies S/H/I or self-harming behaviors but reports that when he gets very anxious or feels upset, he has negative thoughts about dying but reports he would never harm himself or others.  PCP Mayur Byrnes.         Assessed Needs- Functional Domain: Anxiety/Depression, OCD     Prioritized Assessed Needs: Anxiety/Depression, OCD      Life goals, strengths, barriers, past successes: "to be sane, to get better, lessen my anxiety".       Recommendation:   [ x]      Medication Only [ ]     Individual therapy only [ ]     Medication and Individual therapy [ ]     Group therapy   Medication management: 1. Continue sertraline 225mg po qam 2. Continue lorazepam 0.5mg- 1mg po BID prn anxiety 3. continue abilify 2mg po qam to augment anxiety/OCD  Follow up in 8 weeks  [Potential impact of patientâ??s physical health conditions on psychiatric care?] : Potential impact of patient's physical health conditions on psychiatric care: No [Does patient require any additional health services or referrals?] : Does patient require any additional health services or referrals: No

## 2024-05-28 NOTE — REASON FOR VISIT
[Telehealth (audio & video) - Individual/Group] : This visit was provided via telehealth using real-time 2-way audio visual technology. [Medical Office: ([facility])___] : The provider was located at the medical office in [unfilled]. [Home] : The patient, [unfilled], was located at home, [unfilled], at the time of the visit. [Verbal consent obtained from patient/other participant(s)] : Verbal consent for telehealth/telephonic services obtained from patient/other participant(s) [Number can be texted] : number can be texted [OK  to leave message] : OK  to leave message [Other:___] : [unfilled] [[facility] Provider/Facility] : [facility] Provider/Facility [FreeTextEntry4] : 246pm [FreeTextEntry2] : [phone] [FreeTextEntry5] : Venezuelan [FreeTextEntry6] : Hao [FreeTextEntry7] : He/His [FreeTextEntry1] : Anxiety/Depression/OCD

## 2024-05-29 DIAGNOSIS — F41.1 GENERALIZED ANXIETY DISORDER: ICD-10-CM

## 2024-05-29 DIAGNOSIS — F41.0 PANIC DISORDER [EPISODIC PAROXYSMAL ANXIETY]: ICD-10-CM

## 2024-06-25 ENCOUNTER — APPOINTMENT (OUTPATIENT)
Dept: PSYCHIATRY | Facility: CLINIC | Age: 54
End: 2024-06-25
Payer: COMMERCIAL

## 2024-06-25 ENCOUNTER — OUTPATIENT (OUTPATIENT)
Dept: OUTPATIENT SERVICES | Facility: HOSPITAL | Age: 54
LOS: 1 days | End: 2024-06-25
Payer: COMMERCIAL

## 2024-06-25 DIAGNOSIS — F41.1 GENERALIZED ANXIETY DISORDER: ICD-10-CM

## 2024-06-25 DIAGNOSIS — F41.0 PANIC DISORDER [EPISODIC PAROXYSMAL ANXIETY] WITHOUT AGORAPHOBIA: ICD-10-CM

## 2024-06-25 DIAGNOSIS — F41.0 PANIC DISORDER [EPISODIC PAROXYSMAL ANXIETY]: ICD-10-CM

## 2024-06-25 DIAGNOSIS — F42.9 OBSESSIVE-COMPULSIVE DISORDER, UNSPECIFIED: ICD-10-CM

## 2024-06-25 PROCEDURE — 99214 OFFICE O/P EST MOD 30 MIN: CPT

## 2024-06-25 RX ORDER — SERTRALINE HYDROCHLORIDE 100 MG/1
100 TABLET, FILM COATED ORAL DAILY
Qty: 60 | Refills: 2 | Status: ACTIVE | COMMUNITY
Start: 2023-05-01 | End: 1900-01-01

## 2024-06-25 RX ORDER — ARIPIPRAZOLE 2 MG/1
2 TABLET ORAL DAILY
Qty: 30 | Refills: 2 | Status: ACTIVE | COMMUNITY
Start: 2023-10-03 | End: 1900-01-01

## 2024-06-25 RX ORDER — SERTRALINE 25 MG/1
25 TABLET, FILM COATED ORAL
Qty: 30 | Refills: 2 | Status: ACTIVE | COMMUNITY
Start: 2023-09-05 | End: 1900-01-01

## 2024-06-26 DIAGNOSIS — F42.9 OBSESSIVE-COMPULSIVE DISORDER, UNSPECIFIED: ICD-10-CM

## 2024-06-26 DIAGNOSIS — F41.0 PANIC DISORDER [EPISODIC PAROXYSMAL ANXIETY]: ICD-10-CM

## 2024-06-26 DIAGNOSIS — F41.1 GENERALIZED ANXIETY DISORDER: ICD-10-CM

## 2024-09-17 ENCOUNTER — OUTPATIENT (OUTPATIENT)
Dept: OUTPATIENT SERVICES | Facility: HOSPITAL | Age: 54
LOS: 1 days | End: 2024-09-17
Payer: COMMERCIAL

## 2024-09-17 ENCOUNTER — APPOINTMENT (OUTPATIENT)
Dept: PSYCHIATRY | Facility: CLINIC | Age: 54
End: 2024-09-17
Payer: COMMERCIAL

## 2024-09-17 DIAGNOSIS — F41.0 PANIC DISORDER [EPISODIC PAROXYSMAL ANXIETY]: ICD-10-CM

## 2024-09-17 DIAGNOSIS — F42.9 OBSESSIVE-COMPULSIVE DISORDER, UNSPECIFIED: ICD-10-CM

## 2024-09-17 DIAGNOSIS — F41.1 GENERALIZED ANXIETY DISORDER: ICD-10-CM

## 2024-09-17 PROCEDURE — 99214 OFFICE O/P EST MOD 30 MIN: CPT

## 2024-09-17 NOTE — SOCIAL HISTORY
[FreeTextEntry1] : Employment history: Employed at [employer] .\par  Developmental history: Denies.\par  Sexual hx/identity Sexual History/ Concern (include sexual orientation and other relevant information) : Heterosexual.\par  Race - ethnicity - culture information: Turkish.\par  Social supports (friends, Volunteers, club, AA meeting, other meetings ) ? Friends.\par  Meaningful Activities: Walking, Jogging, \par  \par  \par  Spiritual Assessment Tool - FICA\par  F. What is your odin or belief? Shinto, but doesn't practice\par  Do you consider yourself spiritual or Scientology? none\par  Is there something you believe in that gives meaning to your life? N/A\par  I: Is it important in your life? N/A\par  What influence does it have on how you take care of yourself? N/A\par  How have your beliefs influenced your behavior during this illness? What role do your beliefs play in regaining your health? n/A\par  C. Are you part of a spiritual or Scientology community? no\par  Is this of support to you and how? N/A\par  Is there a person or group of people you really love or who are really important to you? "my wife, my kids"\par  H. We have been discussing your belief and supports. What else gives you internal support? "Hope that i will get better"\par  What are your sources of hope, strength, comfort and peace? "my family"\par  What do you hold on to during difficult times? "my family"\par  what sustains you and keeps you going?" my family"\par  A. How would you like me, your healthcare provider, to address these issues in your healthcare? " to help me manage my anxiety"\par  \par   \par  \par  \par  \par   \par  \par  \par

## 2024-09-17 NOTE — FAMILY HISTORY
[FreeTextEntry1] : Family composition:Hao Maravilla 51 y/o M,  since 1982, 3 adult kids, twin boys 18 y/o M, and a daughter 28 y/o F. Domiciled in a rental apartment with wife and the twin boys. \par  Family history and background: Born and raised in [place] and [place]. Employed at a 's place.\par  Family relationship:Great.\par  Pertinent Family Medical, MH and Substance Use History including Adult Child of Alcoholic and child of substance abuse status; history of cancer and heart disease:\par  \par  Father: Alcoholic\par  Son: Depression\par

## 2024-09-17 NOTE — REASON FOR VISIT
[Number can be texted] : number can be texted [OK  to leave message] : OK  to leave message [Other:___] : [unfilled] [FreeTextEntry2] : [phone] [FreeTextEntry5] : British [FreeTextEntry6] : Hao [FreeTextEntry7] : He/His [FreeTextEntry1] : Anxiety/Depression/OCD

## 2024-09-17 NOTE — PAST MEDICAL HISTORY
[FreeTextEntry1] :  Intake date: 4/21/23  \par  Time of intake: 2PM \par  End of Intake: 3:00PM \par  \par  Patient signed all consents including HIPPA permission to talk to wife if needed. Patient is slding scale, HH referral declined. Patient speaks Northern Irish and little english. Patient is okay with using translation services. \par  \par  ED Tele psych referral on 4/17/23. Patient went to ED for severe anxiety, OCD and depression. Patient is sliding scale. The patient's son attends OPD clinic [facility] and sees Dr Osullivan and Padma.   \par  \par  Hao Maravilla 53 y/o M,  since 1984, 3 adult kids, twin boys 18 y/o M, and a daughter 26 y/o F. Domiciled in a rental apartment with wife and the twin boys. Born [place] raised in [place]. Employed at a 's place. Denies substance use. Reports that over 20 years ago one had one IPP for 1 night due to anxiety and depression. Patient reports that 20 years ago he saw a psychiatrist and therapist and was diagnosed with Anxiety and depression and OCD, stopped 3 years ago when the psychiatrist left from [facility] and commute was unbearable. For the last 3 years Dr Leone prescribed the Zoloft 75 mg. The patient has not received mental health in 3 years and is ready to re-engage. Three weeks ago, the patient started exhibiting severe anxiety and went to ED on 4/16/23. At ED the patient prescribed Sertraline 100 mg 14 day supply and Ativan 1 mg. He reports anxiety symptoms such as waking up in panic, overthinking and having panic attacks. The patient reports depressive symptoms such as depressed mood, anhedonia, intrusive thoughts and crying. His OCD fears are around addiction, and he won't take anything that may cause addiction even prescribed medications, he also gets extremely worried about his health and his anxiety getting worst, "sometimes I feel like I may have tumor" fear about his health in general.  Trauma from father he was an alcoholic, never physical abuse but screaming and loud. Denies S/H/I or self-harming behaviors but reports that when he gets very anxious or feels upset, he has negative thoughts about dying but reports he would never harm himself or others.  PCP Mayur Byrnes.

## 2024-09-17 NOTE — DISCUSSION/SUMMARY
[Date of Last Physical Exam: _____] : Date of Last Physical Exam: [unfilled] [Date of Last Annual Labs: _____] : Date of Last Annual Labs: [unfilled] [Date of Last HbgA1c: _____] : Date of Last HbgA1c: [unfilled] [Date of Last Lipid Profile: _____] : Date of Last Lipid Profile: [unfilled] [Low acute suicide risk] : Low acute suicide risk [No] : No [Not clinically indicated] : Safety Plan completed/updated (for individuals at risk): Not clinically indicated [Initial Plan] : Initial Plan [Able to manage surrounding demands and opportunities] : able to manage surrounding demands and opportunities [Able to exercise self-direction] : able to exercise self-direction [Able to set and pursue goals] : able to set and pursue goals [Adherent to treatment recommendations] : adherent to treatment recommendations [Articulate] : articulate [Attempting to realize their potential] : Attempting to realize their potential [Awareness of substance use issues] : awareness of substance use issues [Cognitively intact] : cognitively intact [Insightful] : insightful [Creative] : creative [Intelligent] : intelligent [Motivated to participate in treatment] : motivated to participate in treatment [Motivated and ready for change] : motivated and ready for change [Has vocational interests or hobbies] : has vocational interests or hobbies [Part of a supportive family] : part of a supportive family [Steady employment] : steady employment [Housing stability] : housing stability [High level of education] : high level of education [English fluency] : English fluency [FreeTextEntry2] : 5/1/25 [FreeTextEntry3] : 5/1/23 [Initial] : Initial [Mental Health] : Mental Health [Continued - Progress made] : Continued - Progress made: [every ___ weeks] : every [unfilled] weeks [Improvement in symptoms as evidenced by:] : Improvement in symptoms as evidenced by: [Yes] : Yes [de-identified] : on a daily basis [FreeTextEntry4] : euthymic mood on a daily basis [FreeTextEntry5] : on a daily basis [de-identified] : symptom free off medication [Annual Review of Systems Completed?] : Annual Review of Systems Completed: No [Tobacco Screening Completed?] : Tobacco Screening Completed: No [Potential impact of patient’s physical health conditions on psychiatric care?] : Potential impact of patient's physical health conditions on psychiatric care: No [FreeTextEntry1] :  Patient signed all consents including HIPPA permission to talk to wife if needed. Patient is slding scale, HH referral declined. Patient speaks Bolivian and little english. Patient is okay with using translation services.   ED Tele psych referral on 4/17/23. Patient went to ED for severe anxiety, OCD and depression. Patient is sliding scale. The patient's son attends [facility] and sees Dr Osullivan and Padma.     Hao Maravilla 53 y/o M,  since 1982, 3 adult kids, twin boys 20 y/o M, and a daughter 28 y/o F. Domiciled in a rental apartment with wife and the twin boys. Born and raised in [place] and [place]. Employed at a [employer]. Denies substance use. Reports that over 20 years ago one had one IPP for 1 night due to anxiety and depression. Patient reports that 20 years ago he saw a psychiatrist and therapist and was diagnosed with Anxiety and depression and OCD, stopped 3 years ago when the psychiatrist left from [facility] and commute was unbearable. For the last 3 years Dr Leone prescribed the Zoloft 75 mg. The patient has not received mental health in 3 years and is ready to re-engage. Three weeks ago, the patient started exhibiting severe anxiety and went to ED on 4/16/23. At ED the patient prescribed Sertraline 100 mg 14 day supply and Ativan 1 mg. He reports anxiety symptoms such as waking up in panic, overthinking and having panic attacks. The patient reports depressive symptoms such as depressed mood, anhedonia, intrusive thoughts and crying. His OCD fears are around addiction, and he won't take anything that may cause addiction even prescribed medications, he also gets extremely worried about his health and his anxiety getting worst, "sometimes I feel like I may have tumor" fear about his health in general.  Trauma from father he was an alcoholic, never physical abuse but screaming and loud. Denies S/H/I or self-harming behaviors but reports that when he gets very anxious or feels upset, he has negative thoughts about dying but reports he would never harm himself or others.  PCP Mayur Byrnes.         Assessed Needs- Functional Domain: Anxiety/Depression, OCD     Prioritized Assessed Needs: Anxiety/Depression, OCD      Life goals, strengths, barriers, past successes: "to be sane, to get better, lessen my anxiety".       Recommendation:   [ x]      Medication Only [ ]     Individual therapy only [ ]     Medication and Individual therapy [ ]     Group therapy   Medication management: 1. Continue sertraline 225mg po qam 2. Continue lorazepam 0.5mg- 1mg po BID prn anxiety 3. continue abilify 2mg po qam to augment anxiety/OCD  Follow up in 12 weeks  [Potential impact of patientâ??s physical health conditions on psychiatric care?] : Potential impact of patient's physical health conditions on psychiatric care: No [Does patient require any additional health services or referrals?] : Does patient require any additional health services or referrals: No

## 2024-09-17 NOTE — DISCUSSION/SUMMARY
[Date of Last Physical Exam: _____] : Date of Last Physical Exam: [unfilled] [Date of Last Annual Labs: _____] : Date of Last Annual Labs: [unfilled] [Date of Last HbgA1c: _____] : Date of Last HbgA1c: [unfilled] [Date of Last Lipid Profile: _____] : Date of Last Lipid Profile: [unfilled] [Low acute suicide risk] : Low acute suicide risk [No] : No [Not clinically indicated] : Safety Plan completed/updated (for individuals at risk): Not clinically indicated [Initial Plan] : Initial Plan [Able to manage surrounding demands and opportunities] : able to manage surrounding demands and opportunities [Able to exercise self-direction] : able to exercise self-direction [Able to set and pursue goals] : able to set and pursue goals [Adherent to treatment recommendations] : adherent to treatment recommendations [Articulate] : articulate [Attempting to realize their potential] : Attempting to realize their potential [Awareness of substance use issues] : awareness of substance use issues [Cognitively intact] : cognitively intact [Insightful] : insightful [Creative] : creative [Intelligent] : intelligent [Motivated to participate in treatment] : motivated to participate in treatment [Motivated and ready for change] : motivated and ready for change [Has vocational interests or hobbies] : has vocational interests or hobbies [Part of a supportive family] : part of a supportive family [Steady employment] : steady employment [Housing stability] : housing stability [High level of education] : high level of education [English fluency] : English fluency [FreeTextEntry2] : 5/1/25 [FreeTextEntry3] : 5/1/23 [Initial] : Initial [Mental Health] : Mental Health [Continued - Progress made] : Continued - Progress made: [every ___ weeks] : every [unfilled] weeks [Improvement in symptoms as evidenced by:] : Improvement in symptoms as evidenced by: [Yes] : Yes [de-identified] : on a daily basis [FreeTextEntry4] : euthymic mood on a daily basis [FreeTextEntry5] : on a daily basis [de-identified] : symptom free off medication [Annual Review of Systems Completed?] : Annual Review of Systems Completed: No [Tobacco Screening Completed?] : Tobacco Screening Completed: No [Potential impact of patient’s physical health conditions on psychiatric care?] : Potential impact of patient's physical health conditions on psychiatric care: No [FreeTextEntry1] :  Patient signed all consents including HIPPA permission to talk to wife if needed. Patient is slding scale, HH referral declined. Patient speaks Tanzanian and little english. Patient is okay with using translation services.   ED Tele psych referral on 4/17/23. Patient went to ED for severe anxiety, OCD and depression. Patient is sliding scale. The patient's son attends [facility] and sees Dr Osullivan and Padma.     Hao Maravilla 53 y/o M,  since 1982, 3 adult kids, twin boys 20 y/o M, and a daughter 26 y/o F. Domiciled in a rental apartment with wife and the twin boys. Born [place] raised in [place]. Employed at a [employer]. Denies substance use. Reports that over 20 years ago one had one IPP for 1 night due to anxiety and depression. Patient reports that 20 years ago he saw a psychiatrist and therapist and was diagnosed with Anxiety and depression and OCD, stopped 3 years ago when the psychiatrist left from [facility] and commute was unbearable. For the last 3 years Dr Leone prescribed the Zoloft 75 mg. The patient has not received mental health in 3 years and is ready to re-engage. Three weeks ago, the patient started exhibiting severe anxiety and went to ED on 4/16/23. At ED the patient prescribed Sertraline 100 mg 14 day supply and Ativan 1 mg. He reports anxiety symptoms such as waking up in panic, overthinking and having panic attacks. The patient reports depressive symptoms such as depressed mood, anhedonia, intrusive thoughts and crying. His OCD fears are around addiction, and he won't take anything that may cause addiction even prescribed medications, he also gets extremely worried about his health and his anxiety getting worst, "sometimes I feel like I may have tumor" fear about his health in general.  Trauma from father he was an alcoholic, never physical abuse but screaming and loud. Denies S/H/I or self-harming behaviors but reports that when he gets very anxious or feels upset, he has negative thoughts about dying but reports he would never harm himself or others.  PCP Mayur Byrnes.         Assessed Needs- Functional Domain: Anxiety/Depression, OCD     Prioritized Assessed Needs: Anxiety/Depression, OCD      Life goals, strengths, barriers, past successes: "to be sane, to get better, lessen my anxiety".       Recommendation:   [ x]      Medication Only [ ]     Individual therapy only [ ]     Medication and Individual therapy [ ]     Group therapy   Medication management: 1. Continue sertraline 225mg po qam 2. Continue lorazepam 0.5mg- 1mg po BID prn anxiety 3. continue abilify 2mg po qam to augment anxiety/OCD  Follow up in 12 weeks  [Potential impact of patientâ??s physical health conditions on psychiatric care?] : Potential impact of patient's physical health conditions on psychiatric care: No [Does patient require any additional health services or referrals?] : Does patient require any additional health services or referrals: No

## 2024-09-17 NOTE — SOCIAL HISTORY
[FreeTextEntry1] : Employment history: Employed at [employer] .\par  Developmental history: Denies.\par  Sexual hx/identity Sexual History/ Concern (include sexual orientation and other relevant information) : Heterosexual.\par  Race - ethnicity - culture information: Citizen of [place].\par  Social supports (friends, Volunteers, club, AA meeting, other meetings ) ? Friends.\par  Meaningful Activities: Walking, Jogging, \par  \par  \par  Spiritual Assessment Tool - [employer]\par  F. What is your odin or belief? Temple, but doesn't practice\par  Do you consider yourself spiritual or Episcopal? none\par  Is there something you believe in that gives meaning to your life? N/A\par  I: Is it important in your life? N/A\par  What influence does it have on how you take care of yourself? N/A\par  How have your beliefs influenced your behavior during this illness? What role do your beliefs play in regaining your health? n/A\par  C. Are you part of a spiritual or Episcopal community? no\par  Is this of support to you and how? N/A\par  Is there a person or group of people you really love or who are really important to you? "my wife, my kids"\par  H. We have been discussing your belief and supports. What else gives you internal support? "Hope that i will get better"\par  What are your sources of hope, strength, comfort and peace? "my family"\par  What do you hold on to during difficult times? "my family"\par  what sustains you and keeps you going?" my family"\par  A. How would you like me, your healthcare provider, to address these issues in your healthcare? " to help me manage my anxiety"\par  \par   \par  \par  \par  \par   \par  \par  \par

## 2024-09-17 NOTE — PAST MEDICAL HISTORY
[FreeTextEntry1] :  Intake date: 4/21/23  \par  Time of intake: 2PM \par  End of Intake: 3:00PM \par  \par  Patient signed all consents including HIPPA permission to talk to wife if needed. Patient is slding scale, HH referral declined. Patient speaks Hungarian and little english. Patient is okay with using translation services. \par  \par  ED Tele psych referral on 4/17/23. Patient went to ED for severe anxiety, OCD and depression. Patient is sliding scale. The patient's son attends [facility] and sees Dr Osullivan and Padma.   \par  \par  Hao Maravilla 51 y/o M,  since 1984, 3 adult kids, twin boys 20 y/o M, and a daughter 28 y/o F. Domiciled in a rental apartment with wife and the twin boys. Born [place] raised in [place]. Employed at a 's place. Denies substance use. Reports that over 20 years ago one had one IPP for 1 night due to anxiety and depression. Patient reports that 20 years ago he saw a psychiatrist and therapist and was diagnosed with Anxiety and depression and OCD, stopped 3 years ago when the psychiatrist left from [facility] and commute was unbearable. For the last 3 years Dr Leone prescribed the Zoloft 75 mg. The patient has not received mental health in 3 years and is ready to re-engage. Three weeks ago, the patient started exhibiting severe anxiety and went to ED on 4/16/23. At ED the patient prescribed Sertraline 100 mg 14 day supply and Ativan 1 mg. He reports anxiety symptoms such as waking up in panic, overthinking and having panic attacks. The patient reports depressive symptoms such as depressed mood, anhedonia, intrusive thoughts and crying. His OCD fears are around addiction, and he won't take anything that may cause addiction even prescribed medications, he also gets extremely worried about his health and his anxiety getting worst, "sometimes I feel like I may have tumor" fear about his health in general.  Trauma from father he was an alcoholic, never physical abuse but screaming and loud. Denies S/H/I or self-harming behaviors but reports that when he gets very anxious or feels upset, he has negative thoughts about dying but reports he would never harm himself or others.  PCP Myaur Byrnes.

## 2024-09-17 NOTE — PHYSICAL EXAM
[None] : none [Cooperative] : cooperative [Euthymic] : euthymic [Full] : full [Clear] : clear [Linear/Goal Directed] : linear/goal directed [Average] : average [WNL] : within normal limits [2 - Borderline mentally ill] : 2 - Borderline mentally ill (subtle or suspected pathology) [3 - Minimally improved] : 3 - Minimally improved  (slightly better with little or no clinically meaningful reduction of symptoms. Represents very little change in basic clinical status, level of care, or functional capacity)

## 2024-09-17 NOTE — HISTORY OF PRESENT ILLNESS
[Not Applicable] : Not applicable [FreeTextEntry1] : Now in English This is a 54 year old patient who presents for medication management.  The patient reports stable mood, good, sleep and appetite.  The patient denies any symptoms of depression, hailey, or psychosis at this time.  The patient has less anxiety that impairs their daily functioning. The patient denies any suicidal ideation, homicidal ideation, no auditory or visual hallucinations, or paranoid ideation.  The patient has no medical complaints. The patient denies any drug or alcohol use, and is not smoking at this time. I requested the patient's updated physical and labs from their PCP.  Coping skills were discussed and a safety plan was provided.  The patient was educated on the risks, benefits, and alternatives of their psychiatric medications.  The patient will not engage in psychotherapy at this time.  The patient consents to ongoing medication management.  Elects to continue current medication.  [FreeTextEntry2] : 1 IPP 20 years ago for Anxiety. [FreeTextEntry3] : 100 mg Sertraline 1 mg Ativan. risperdal was too strong

## 2024-09-17 NOTE — REASON FOR VISIT
[Number can be texted] : number can be texted [OK  to leave message] : OK  to leave message [Other:___] : [unfilled] [FreeTextEntry2] : [phone] [FreeTextEntry5] : [place] [FreeTextEntry6] : Hao [FreeTextEntry7] : He/His [FreeTextEntry1] : Anxiety/Depression/OCD

## 2024-09-18 DIAGNOSIS — F41.1 GENERALIZED ANXIETY DISORDER: ICD-10-CM

## 2024-09-18 DIAGNOSIS — F42.9 OBSESSIVE-COMPULSIVE DISORDER, UNSPECIFIED: ICD-10-CM

## 2024-09-18 DIAGNOSIS — F41.0 PANIC DISORDER [EPISODIC PAROXYSMAL ANXIETY]: ICD-10-CM

## 2024-12-10 ENCOUNTER — OUTPATIENT (OUTPATIENT)
Dept: OUTPATIENT SERVICES | Facility: HOSPITAL | Age: 54
LOS: 1 days | End: 2024-12-10
Payer: COMMERCIAL

## 2024-12-10 ENCOUNTER — APPOINTMENT (OUTPATIENT)
Dept: PSYCHIATRY | Facility: CLINIC | Age: 54
End: 2024-12-10
Payer: COMMERCIAL

## 2024-12-10 DIAGNOSIS — F42.9 OBSESSIVE-COMPULSIVE DISORDER, UNSPECIFIED: ICD-10-CM

## 2024-12-10 DIAGNOSIS — F41.0 PANIC DISORDER [EPISODIC PAROXYSMAL ANXIETY]: ICD-10-CM

## 2024-12-10 DIAGNOSIS — F41.1 GENERALIZED ANXIETY DISORDER: ICD-10-CM

## 2024-12-10 PROCEDURE — 99214 OFFICE O/P EST MOD 30 MIN: CPT

## 2024-12-11 DIAGNOSIS — F41.0 PANIC DISORDER [EPISODIC PAROXYSMAL ANXIETY]: ICD-10-CM

## 2024-12-11 DIAGNOSIS — F41.1 GENERALIZED ANXIETY DISORDER: ICD-10-CM

## 2024-12-16 ENCOUNTER — APPOINTMENT (OUTPATIENT)
Dept: INTERNAL MEDICINE | Facility: CLINIC | Age: 54
End: 2024-12-16
Payer: COMMERCIAL

## 2024-12-16 ENCOUNTER — OUTPATIENT (OUTPATIENT)
Dept: OUTPATIENT SERVICES | Facility: HOSPITAL | Age: 54
LOS: 1 days | End: 2024-12-16
Payer: COMMERCIAL

## 2024-12-16 VITALS
SYSTOLIC BLOOD PRESSURE: 130 MMHG | OXYGEN SATURATION: 99 % | DIASTOLIC BLOOD PRESSURE: 84 MMHG | WEIGHT: 202 LBS | HEIGHT: 67 IN | BODY MASS INDEX: 31.71 KG/M2 | HEART RATE: 71 BPM | TEMPERATURE: 97.4 F

## 2024-12-16 DIAGNOSIS — R37 SEXUAL DYSFUNCTION, UNSPECIFIED: ICD-10-CM

## 2024-12-16 DIAGNOSIS — B35.1 TINEA UNGUIUM: ICD-10-CM

## 2024-12-16 DIAGNOSIS — N52.9 MALE ERECTILE DYSFUNCTION, UNSPECIFIED: ICD-10-CM

## 2024-12-16 DIAGNOSIS — F41.1 GENERALIZED ANXIETY DISORDER: ICD-10-CM

## 2024-12-16 DIAGNOSIS — L81.1 CHLOASMA: ICD-10-CM

## 2024-12-16 DIAGNOSIS — F41.9 ANXIETY DISORDER, UNSPECIFIED: ICD-10-CM

## 2024-12-16 DIAGNOSIS — R39.15 URGENCY OF URINATION: ICD-10-CM

## 2024-12-16 DIAGNOSIS — Z00.00 ENCOUNTER FOR GENERAL ADULT MEDICAL EXAMINATION WITHOUT ABNORMAL FINDINGS: ICD-10-CM

## 2024-12-16 PROCEDURE — 99214 OFFICE O/P EST MOD 30 MIN: CPT

## 2024-12-19 DIAGNOSIS — B35.1 TINEA UNGUIUM: ICD-10-CM

## 2024-12-19 DIAGNOSIS — R39.15 URGENCY OF URINATION: ICD-10-CM

## 2024-12-19 DIAGNOSIS — N52.9 MALE ERECTILE DYSFUNCTION, UNSPECIFIED: ICD-10-CM

## 2024-12-19 DIAGNOSIS — F41.1 GENERALIZED ANXIETY DISORDER: ICD-10-CM

## 2024-12-19 DIAGNOSIS — F41.9 ANXIETY DISORDER, UNSPECIFIED: ICD-10-CM

## 2024-12-26 RX ORDER — SILDENAFIL 20 MG/1
20 TABLET ORAL
Qty: 30 | Refills: 0 | Status: ACTIVE | COMMUNITY
Start: 2024-12-16 | End: 1900-01-01

## 2025-02-26 ENCOUNTER — OUTPATIENT (OUTPATIENT)
Dept: OUTPATIENT SERVICES | Facility: HOSPITAL | Age: 55
LOS: 1 days | End: 2025-02-26
Payer: COMMERCIAL

## 2025-02-26 ENCOUNTER — APPOINTMENT (OUTPATIENT)
Dept: INTERNAL MEDICINE | Facility: CLINIC | Age: 55
End: 2025-02-26

## 2025-02-26 VITALS
WEIGHT: 200 LBS | SYSTOLIC BLOOD PRESSURE: 124 MMHG | BODY MASS INDEX: 31.39 KG/M2 | HEIGHT: 67 IN | OXYGEN SATURATION: 97 % | TEMPERATURE: 97.2 F | DIASTOLIC BLOOD PRESSURE: 78 MMHG | HEART RATE: 73 BPM

## 2025-02-26 DIAGNOSIS — M76.60 ACHILLES TENDINITIS, UNSPECIFIED LEG: ICD-10-CM

## 2025-02-26 DIAGNOSIS — F41.9 ANXIETY DISORDER, UNSPECIFIED: ICD-10-CM

## 2025-02-26 DIAGNOSIS — E78.5 HYPERLIPIDEMIA, UNSPECIFIED: ICD-10-CM

## 2025-02-26 DIAGNOSIS — B35.1 TINEA UNGUIUM: ICD-10-CM

## 2025-02-26 DIAGNOSIS — Z00.00 ENCOUNTER FOR GENERAL ADULT MEDICAL EXAMINATION WITHOUT ABNORMAL FINDINGS: ICD-10-CM

## 2025-02-26 PROCEDURE — 99214 OFFICE O/P EST MOD 30 MIN: CPT

## 2025-02-26 PROCEDURE — G2211 COMPLEX E/M VISIT ADD ON: CPT

## 2025-02-26 RX ORDER — TERBINAFINE HYDROCHLORIDE 250 MG/1
250 TABLET ORAL DAILY
Qty: 90 | Refills: 1 | Status: ACTIVE | COMMUNITY
Start: 2025-02-26 | End: 1900-01-01

## 2025-02-26 RX ORDER — TERBINAFINE HYDROCHLORIDE 250 MG/1
250 TABLET ORAL DAILY
Qty: 30 | Refills: 5 | Status: ACTIVE | COMMUNITY
Start: 2025-02-26 | End: 1900-01-01

## 2025-03-04 ENCOUNTER — APPOINTMENT (OUTPATIENT)
Dept: PSYCHIATRY | Facility: CLINIC | Age: 55
End: 2025-03-04
Payer: COMMERCIAL

## 2025-03-04 ENCOUNTER — OUTPATIENT (OUTPATIENT)
Dept: OUTPATIENT SERVICES | Facility: HOSPITAL | Age: 55
LOS: 1 days | End: 2025-03-04
Payer: COMMERCIAL

## 2025-03-04 DIAGNOSIS — F41.1 GENERALIZED ANXIETY DISORDER: ICD-10-CM

## 2025-03-04 DIAGNOSIS — F41.0 PANIC DISORDER [EPISODIC PAROXYSMAL ANXIETY]: ICD-10-CM

## 2025-03-04 DIAGNOSIS — F42.9 OBSESSIVE-COMPULSIVE DISORDER, UNSPECIFIED: ICD-10-CM

## 2025-03-04 DIAGNOSIS — B35.1 TINEA UNGUIUM: ICD-10-CM

## 2025-03-04 DIAGNOSIS — E78.5 HYPERLIPIDEMIA, UNSPECIFIED: ICD-10-CM

## 2025-03-04 DIAGNOSIS — M76.60 ACHILLES TENDINITIS, UNSPECIFIED LEG: ICD-10-CM

## 2025-03-04 PROCEDURE — 99214 OFFICE O/P EST MOD 30 MIN: CPT

## 2025-03-05 DIAGNOSIS — F42.9 OBSESSIVE-COMPULSIVE DISORDER, UNSPECIFIED: ICD-10-CM

## 2025-03-05 DIAGNOSIS — F41.0 PANIC DISORDER [EPISODIC PAROXYSMAL ANXIETY]: ICD-10-CM

## 2025-03-05 DIAGNOSIS — F41.1 GENERALIZED ANXIETY DISORDER: ICD-10-CM

## 2025-04-28 ENCOUNTER — OUTPATIENT (OUTPATIENT)
Dept: OUTPATIENT SERVICES | Facility: HOSPITAL | Age: 55
LOS: 1 days | End: 2025-04-28
Payer: COMMERCIAL

## 2025-04-28 DIAGNOSIS — K30 FUNCTIONAL DYSPEPSIA: ICD-10-CM

## 2025-04-28 DIAGNOSIS — Z00.8 ENCOUNTER FOR OTHER GENERAL EXAMINATION: ICD-10-CM

## 2025-04-28 PROCEDURE — 76700 US EXAM ABDOM COMPLETE: CPT

## 2025-04-28 PROCEDURE — 76700 US EXAM ABDOM COMPLETE: CPT | Mod: 26

## 2025-04-29 DIAGNOSIS — K30 FUNCTIONAL DYSPEPSIA: ICD-10-CM

## 2025-05-15 ENCOUNTER — APPOINTMENT (OUTPATIENT)
Dept: PODIATRY | Facility: CLINIC | Age: 55
End: 2025-05-15

## 2025-05-15 ENCOUNTER — OUTPATIENT (OUTPATIENT)
Dept: OUTPATIENT SERVICES | Facility: HOSPITAL | Age: 55
LOS: 1 days | End: 2025-05-15
Payer: COMMERCIAL

## 2025-05-15 DIAGNOSIS — Z00.00 ENCOUNTER FOR GENERAL ADULT MEDICAL EXAMINATION WITHOUT ABNORMAL FINDINGS: ICD-10-CM

## 2025-05-15 DIAGNOSIS — M79.672 PAIN IN LEFT FOOT: ICD-10-CM

## 2025-05-15 PROCEDURE — 99203 OFFICE O/P NEW LOW 30 MIN: CPT

## 2025-05-15 RX ORDER — DICLOFENAC SODIUM 10 MG/G
1 GEL TOPICAL
Qty: 1 | Refills: 3 | Status: ACTIVE | COMMUNITY
Start: 2025-05-15 | End: 1900-01-01

## 2025-05-27 ENCOUNTER — APPOINTMENT (OUTPATIENT)
Dept: PSYCHIATRY | Facility: CLINIC | Age: 55
End: 2025-05-27

## 2025-05-27 RX ORDER — SERTRALINE 25 MG/1
25 TABLET, FILM COATED ORAL DAILY
Qty: 90 | Refills: 0 | Status: ACTIVE | COMMUNITY
Start: 2025-05-27 | End: 1900-01-01

## 2025-05-28 ENCOUNTER — APPOINTMENT (OUTPATIENT)
Dept: INTERNAL MEDICINE | Facility: CLINIC | Age: 55
End: 2025-05-28

## 2025-05-30 DIAGNOSIS — M79.672 PAIN IN LEFT FOOT: ICD-10-CM
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
## 2025-05-30 DIAGNOSIS — M71.9 BURSOPATHY, UNSPECIFIED: ICD-10-CM

## 2025-06-05 ENCOUNTER — APPOINTMENT (OUTPATIENT)
Dept: PODIATRY | Facility: CLINIC | Age: 55
End: 2025-06-05

## 2025-06-10 ENCOUNTER — OUTPATIENT (OUTPATIENT)
Dept: OUTPATIENT SERVICES | Facility: HOSPITAL | Age: 55
LOS: 1 days | End: 2025-06-10
Payer: COMMERCIAL

## 2025-06-10 ENCOUNTER — APPOINTMENT (OUTPATIENT)
Dept: PSYCHIATRY | Facility: CLINIC | Age: 55
End: 2025-06-10
Payer: COMMERCIAL

## 2025-06-10 DIAGNOSIS — F41.1 GENERALIZED ANXIETY DISORDER: ICD-10-CM

## 2025-06-10 DIAGNOSIS — F42.9 OBSESSIVE-COMPULSIVE DISORDER, UNSPECIFIED: ICD-10-CM

## 2025-06-10 DIAGNOSIS — F41.0 PANIC DISORDER [EPISODIC PAROXYSMAL ANXIETY]: ICD-10-CM

## 2025-06-10 PROCEDURE — 99214 OFFICE O/P EST MOD 30 MIN: CPT

## 2025-06-11 DIAGNOSIS — F42.9 OBSESSIVE-COMPULSIVE DISORDER, UNSPECIFIED: ICD-10-CM

## 2025-06-11 DIAGNOSIS — F41.1 GENERALIZED ANXIETY DISORDER: ICD-10-CM

## 2025-06-11 DIAGNOSIS — F41.0 PANIC DISORDER [EPISODIC PAROXYSMAL ANXIETY]: ICD-10-CM

## 2025-07-07 ENCOUNTER — RX RENEWAL (OUTPATIENT)
Age: 55
End: 2025-07-07

## 2025-07-24 ENCOUNTER — OUTPATIENT (OUTPATIENT)
Dept: OUTPATIENT SERVICES | Facility: HOSPITAL | Age: 55
LOS: 1 days | End: 2025-07-24

## 2025-07-24 ENCOUNTER — APPOINTMENT (OUTPATIENT)
Dept: DERMATOLOGY | Facility: CLINIC | Age: 55
End: 2025-07-24

## 2025-07-24 DIAGNOSIS — Z00.00 ENCOUNTER FOR GENERAL ADULT MEDICAL EXAMINATION WITHOUT ABNORMAL FINDINGS: ICD-10-CM

## 2025-07-24 PROCEDURE — 99204 OFFICE O/P NEW MOD 45 MIN: CPT

## 2025-07-24 RX ORDER — TRIAMCINOLONE ACETONIDE 1 MG/ML
0.1 LOTION TOPICAL TWICE DAILY
Qty: 1 | Refills: 0 | Status: ACTIVE | COMMUNITY
Start: 2025-07-24 | End: 1900-01-01

## 2025-07-24 RX ORDER — HYDROCORTISONE 10 MG/ML
1 LOTION TOPICAL
Qty: 60 | Refills: 0 | Status: ACTIVE | COMMUNITY
Start: 2025-07-24 | End: 1900-01-01

## 2025-07-29 ENCOUNTER — APPOINTMENT (OUTPATIENT)
Dept: NEUROLOGY | Facility: CLINIC | Age: 55
End: 2025-07-29

## 2025-08-01 DIAGNOSIS — L28.0 LICHEN SIMPLEX CHRONICUS: ICD-10-CM

## 2025-08-01 DIAGNOSIS — L25.9 UNSPECIFIED CONTACT DERMATITIS, UNSPECIFIED CAUSE: ICD-10-CM

## 2025-09-02 ENCOUNTER — APPOINTMENT (OUTPATIENT)
Dept: PSYCHIATRY | Facility: CLINIC | Age: 55
End: 2025-09-02
Payer: COMMERCIAL

## 2025-09-02 ENCOUNTER — OUTPATIENT (OUTPATIENT)
Dept: OUTPATIENT SERVICES | Facility: HOSPITAL | Age: 55
LOS: 1 days | End: 2025-09-02
Payer: COMMERCIAL

## 2025-09-02 DIAGNOSIS — F41.0 PANIC DISORDER [EPISODIC PAROXYSMAL ANXIETY]: ICD-10-CM

## 2025-09-02 DIAGNOSIS — F41.1 GENERALIZED ANXIETY DISORDER: ICD-10-CM

## 2025-09-02 DIAGNOSIS — F42.9 OBSESSIVE-COMPULSIVE DISORDER, UNSPECIFIED: ICD-10-CM

## 2025-09-02 PROCEDURE — 99214 OFFICE O/P EST MOD 30 MIN: CPT

## 2025-09-03 DIAGNOSIS — F41.1 GENERALIZED ANXIETY DISORDER: ICD-10-CM

## 2025-09-03 DIAGNOSIS — F41.0 PANIC DISORDER [EPISODIC PAROXYSMAL ANXIETY]: ICD-10-CM

## 2025-09-03 DIAGNOSIS — F42.9 OBSESSIVE-COMPULSIVE DISORDER, UNSPECIFIED: ICD-10-CM

## 2025-09-12 ENCOUNTER — APPOINTMENT (OUTPATIENT)
Dept: OPHTHALMOLOGY | Facility: CLINIC | Age: 55
End: 2025-09-12
Payer: COMMERCIAL

## 2025-09-12 PROCEDURE — 92134 CPTRZ OPH DX IMG PST SGM RTA: CPT | Mod: 26

## 2025-09-12 PROCEDURE — 92014 COMPRE OPH EXAM EST PT 1/>: CPT
